# Patient Record
Sex: FEMALE | Race: WHITE | NOT HISPANIC OR LATINO | Employment: UNEMPLOYED
[De-identification: names, ages, dates, MRNs, and addresses within clinical notes are randomized per-mention and may not be internally consistent; named-entity substitution may affect disease eponyms.]

---

## 2017-06-17 ENCOUNTER — HEALTH MAINTENANCE LETTER (OUTPATIENT)
Age: 32
End: 2017-06-17

## 2018-06-05 ENCOUNTER — HOSPITAL ENCOUNTER (EMERGENCY)
Facility: CLINIC | Age: 33
Discharge: HOME | End: 2018-06-05
Attending: EMERGENCY MEDICINE | Admitting: EMERGENCY MEDICINE
Payer: COMMERCIAL

## 2018-06-05 ENCOUNTER — APPOINTMENT (OUTPATIENT)
Dept: GENERAL RADIOLOGY | Facility: CLINIC | Age: 33
End: 2018-06-05
Attending: EMERGENCY MEDICINE
Payer: COMMERCIAL

## 2018-06-05 VITALS
SYSTOLIC BLOOD PRESSURE: 152 MMHG | TEMPERATURE: 97 F | HEIGHT: 62 IN | DIASTOLIC BLOOD PRESSURE: 148 MMHG | WEIGHT: 255 LBS | BODY MASS INDEX: 46.93 KG/M2 | RESPIRATION RATE: 14 BRPM | OXYGEN SATURATION: 98 %

## 2018-06-05 DIAGNOSIS — R11.2 NON-INTRACTABLE VOMITING WITH NAUSEA, UNSPECIFIED VOMITING TYPE: ICD-10-CM

## 2018-06-05 DIAGNOSIS — R07.89 CHEST DISCOMFORT: ICD-10-CM

## 2018-06-05 LAB
ANION GAP SERPL CALCULATED.3IONS-SCNC: 13 MMOL/L (ref 3–14)
BASOPHILS # BLD AUTO: 0 10E9/L (ref 0–0.2)
BASOPHILS NFR BLD AUTO: 0.2 %
BUN SERPL-MCNC: 8 MG/DL (ref 7–30)
CALCIUM SERPL-MCNC: 9 MG/DL (ref 8.5–10.1)
CHLORIDE SERPL-SCNC: 106 MMOL/L (ref 94–109)
CO2 SERPL-SCNC: 21 MMOL/L (ref 20–32)
CREAT SERPL-MCNC: 0.64 MG/DL (ref 0.52–1.04)
D DIMER PPP FEU-MCNC: <0.3 UG/ML FEU (ref 0–0.5)
DIFFERENTIAL METHOD BLD: ABNORMAL
EOSINOPHIL # BLD AUTO: 0 10E9/L (ref 0–0.7)
EOSINOPHIL NFR BLD AUTO: 0 %
ERYTHROCYTE [DISTWIDTH] IN BLOOD BY AUTOMATED COUNT: 12.7 % (ref 10–15)
GFR SERPL CREATININE-BSD FRML MDRD: >90 ML/MIN/1.7M2
GLUCOSE SERPL-MCNC: 139 MG/DL (ref 70–99)
HCT VFR BLD AUTO: 44.6 % (ref 35–47)
HGB BLD-MCNC: 15.8 G/DL (ref 11.7–15.7)
IMM GRANULOCYTES # BLD: 0 10E9/L (ref 0–0.4)
IMM GRANULOCYTES NFR BLD: 0.4 %
INTERPRETATION ECG - MUSE: NORMAL
LYMPHOCYTES # BLD AUTO: 0.8 10E9/L (ref 0.8–5.3)
LYMPHOCYTES NFR BLD AUTO: 7.5 %
MCH RBC QN AUTO: 30.6 PG (ref 26.5–33)
MCHC RBC AUTO-ENTMCNC: 35.4 G/DL (ref 31.5–36.5)
MCV RBC AUTO: 86 FL (ref 78–100)
MONOCYTES # BLD AUTO: 0.4 10E9/L (ref 0–1.3)
MONOCYTES NFR BLD AUTO: 3.9 %
NEUTROPHILS # BLD AUTO: 9.8 10E9/L (ref 1.6–8.3)
NEUTROPHILS NFR BLD AUTO: 88 %
NRBC # BLD AUTO: 0 10*3/UL
NRBC BLD AUTO-RTO: 0 /100
PLATELET # BLD AUTO: 250 10E9/L (ref 150–450)
POTASSIUM SERPL-SCNC: 3.5 MMOL/L (ref 3.4–5.3)
RBC # BLD AUTO: 5.16 10E12/L (ref 3.8–5.2)
SODIUM SERPL-SCNC: 140 MMOL/L (ref 133–144)
TROPONIN I SERPL-MCNC: <0.015 UG/L (ref 0–0.04)
WBC # BLD AUTO: 11.1 10E9/L (ref 4–11)

## 2018-06-05 PROCEDURE — 25000125 ZZHC RX 250: Performed by: EMERGENCY MEDICINE

## 2018-06-05 PROCEDURE — 96361 HYDRATE IV INFUSION ADD-ON: CPT

## 2018-06-05 PROCEDURE — 85025 COMPLETE CBC W/AUTO DIFF WBC: CPT | Performed by: EMERGENCY MEDICINE

## 2018-06-05 PROCEDURE — 93005 ELECTROCARDIOGRAM TRACING: CPT

## 2018-06-05 PROCEDURE — 99285 EMERGENCY DEPT VISIT HI MDM: CPT | Mod: 25

## 2018-06-05 PROCEDURE — 80048 BASIC METABOLIC PNL TOTAL CA: CPT | Performed by: EMERGENCY MEDICINE

## 2018-06-05 PROCEDURE — 96374 THER/PROPH/DIAG INJ IV PUSH: CPT

## 2018-06-05 PROCEDURE — 71046 X-RAY EXAM CHEST 2 VIEWS: CPT

## 2018-06-05 PROCEDURE — 84484 ASSAY OF TROPONIN QUANT: CPT | Performed by: EMERGENCY MEDICINE

## 2018-06-05 PROCEDURE — 25000128 H RX IP 250 OP 636: Performed by: EMERGENCY MEDICINE

## 2018-06-05 PROCEDURE — 85379 FIBRIN DEGRADATION QUANT: CPT | Performed by: EMERGENCY MEDICINE

## 2018-06-05 PROCEDURE — 96375 TX/PRO/DX INJ NEW DRUG ADDON: CPT

## 2018-06-05 PROCEDURE — 25000132 ZZH RX MED GY IP 250 OP 250 PS 637: Performed by: EMERGENCY MEDICINE

## 2018-06-05 RX ORDER — METOCLOPRAMIDE 10 MG/1
10 TABLET ORAL 4 TIMES DAILY PRN
Qty: 10 TABLET | Refills: 0 | Status: SHIPPED | OUTPATIENT
Start: 2018-06-05 | End: 2021-04-16

## 2018-06-05 RX ORDER — METOCLOPRAMIDE HYDROCHLORIDE 5 MG/ML
10 INJECTION INTRAMUSCULAR; INTRAVENOUS ONCE
Status: COMPLETED | OUTPATIENT
Start: 2018-06-05 | End: 2018-06-05

## 2018-06-05 RX ORDER — ONDANSETRON 2 MG/ML
4 INJECTION INTRAMUSCULAR; INTRAVENOUS
Status: COMPLETED | OUTPATIENT
Start: 2018-06-05 | End: 2018-06-05

## 2018-06-05 RX ORDER — DIPHENHYDRAMINE HYDROCHLORIDE 50 MG/ML
25 INJECTION INTRAMUSCULAR; INTRAVENOUS ONCE
Status: COMPLETED | OUTPATIENT
Start: 2018-06-05 | End: 2018-06-05

## 2018-06-05 RX ORDER — SODIUM CHLORIDE, SODIUM LACTATE, POTASSIUM CHLORIDE, CALCIUM CHLORIDE 600; 310; 30; 20 MG/100ML; MG/100ML; MG/100ML; MG/100ML
1000 INJECTION, SOLUTION INTRAVENOUS CONTINUOUS
Status: DISCONTINUED | OUTPATIENT
Start: 2018-06-05 | End: 2018-06-05 | Stop reason: HOSPADM

## 2018-06-05 RX ORDER — PROMETHAZINE HYDROCHLORIDE 25 MG/ML
25 INJECTION, SOLUTION INTRAMUSCULAR; INTRAVENOUS ONCE
Status: DISCONTINUED | OUTPATIENT
Start: 2018-06-05 | End: 2018-06-05 | Stop reason: HOSPADM

## 2018-06-05 RX ADMIN — DIPHENHYDRAMINE HYDROCHLORIDE 25 MG: 50 INJECTION, SOLUTION INTRAMUSCULAR; INTRAVENOUS at 12:55

## 2018-06-05 RX ADMIN — SODIUM CHLORIDE, POTASSIUM CHLORIDE, SODIUM LACTATE AND CALCIUM CHLORIDE 1000 ML: 600; 310; 30; 20 INJECTION, SOLUTION INTRAVENOUS at 11:28

## 2018-06-05 RX ADMIN — ONDANSETRON 4 MG: 2 INJECTION INTRAMUSCULAR; INTRAVENOUS at 11:28

## 2018-06-05 RX ADMIN — LIDOCAINE HYDROCHLORIDE 30 ML: 20 SOLUTION ORAL; TOPICAL at 11:57

## 2018-06-05 RX ADMIN — METOCLOPRAMIDE 10 MG: 5 INJECTION, SOLUTION INTRAMUSCULAR; INTRAVENOUS at 12:55

## 2018-06-05 ASSESSMENT — ENCOUNTER SYMPTOMS
VOMITING: 1
HEMATURIA: 0
DYSURIA: 0
FEVER: 0
FREQUENCY: 0
DIARRHEA: 0
ABDOMINAL PAIN: 0
NAUSEA: 1
SHORTNESS OF BREATH: 1

## 2018-06-05 NOTE — ED AVS SNAPSHOT
Emergency Department    [address] [facility] [address]    Phone:  [phone]    Fax:  [phone]                                       Layne Blas   MRN: [id number]    Department:   Emergency Department   Date of Visit:  6/5/2018           After Visit Summary Signature Page     I have received my discharge instructions, and my questions have been answered. I have discussed any challenges I see with this plan with the nurse or doctor.    ..........................................................................................................................................  Patient/Patient Representative Signature      ..........................................................................................................................................  Patient Representative Print Name and Relationship to Patient    ..................................................               ................................................  Date                                            Time    ..........................................................................................................................................  Reviewed by Signature/Title    ...................................................              ..............................................  Date                                                            Time

## 2018-06-05 NOTE — ED AVS SNAPSHOT
Emergency Department    [address] [facility] [address]    Phone:  [phone]    Fax:  [phone]                                       Layne Blas   MRN: [id number]    Department:   Emergency Department   Date of Visit:  6/5/2018           Patient Information     Date Of Birth          1985        Your diagnoses for this visit were:     Non-intractable vomiting with nausea, unspecified vomiting type     Chest discomfort        You were seen by Austyn Oh DO.      Follow-up Information     Follow up with Clinic, [facility] In 2 days.    Why:  As needed    Contact information:    [address] [facility] [address] [phone]          Follow up with  Emergency Department.    Specialty:  EMERGENCY MEDICINE    Why:  If symptoms worsen    Contact information:    [address] [facility] [id number] [phone]        Discharge Instructions       Return to the emergency department or seek medical care as instructed if your symptoms fail to improve or significantly worsen.    Take Acetaminophen (aka Tylenol) and/or Pepcid as needed for symptom/pain relief; use as directed.    Take reglan as need for nausea; use as directed.    Follow-up as indicated on page 1.  Maintain adequate hydration and get plenty of rest.    Discharge References/Attachments     VOMITING (6Y-ADULT) (ENGLISH)      24 Hour Appointment Hotline       To make an appointment at any [facility], call [phone] ([phone]). If you don't have a family doctor or clinic, we will help you find one. [facility] clinics are conveniently located to serve the needs of you and your family.             Review of your medicines      START taking        Dose / Directions Last dose taken    metoclopramide 10 MG tablet   Commonly known as:  REGLAN   Dose:  10 mg   Quantity:  10 tablet        Take 1 tablet (10 mg) by mouth 4 times daily as needed For  nausea   Refills:  0          Our records show that you are taking the medicines listed below. If these are incorrect, please call your family doctor or clinic.        Dose / Directions Last dose taken    IMITREX PO   Indication:  [facility] [phone]        Refills:  0        QUEtiapine 25 MG tablet   Commonly known as:  SEROquel   Dose:  25 mg   Quantity:  30 tablet        Take 1 tablet (25 mg) by mouth At Bedtime   Refills:  0        UNKNOWN TO PATIENT   Dose:  1 tablet        Take 1 tablet by mouth daily Birth Control Pill   Refills:  0        vilazodone 20 MG Tabs tablet   Commonly known as:  VIIBRYD   Dose:  20 mg   Quantity:  1 tablet        Take 1 tablet (20 mg) by mouth daily   Refills:  0        VITAMIN D (CHOLECALCIFEROL) PO        Take by mouth daily   Refills:  0                Prescriptions were sent or printed at these locations (1 Prescription)                   Other Prescriptions                Printed at Department/Unit printer (1 of 1)         metoclopramide (REGLAN) 10 MG tablet                Procedures and tests performed during your visit     Basic metabolic panel    CBC with platelets differential    D dimer quantitative    EKG 12 lead    Troponin I    XR Chest 2 Views      Orders Needing Specimen Collection     None      Pending Results     No orders found from 6/3/2018 to 6/6/2018.            Pending Culture Results     No orders found from 6/3/2018 to 6/6/2018.            Pending Results Instructions     If you had any lab results that were not finalized at the time of your Discharge, you can call the ED Lab Result RN at [phone]. You will be contacted by this team for any positive Lab results or changes in treatment. The nurses are available 7 days a week from 10A to 6:30P.  You can leave a message 24 hours per day and they will return your call.        Test Results From Your Hospital Stay        6/5/2018 11:36 AM      Component Results     Component Value Ref Range & Units Status     WBC 11.1 (H) 4.0 - 11.0 10e9/L Final    RBC Count 5.16 3.8 - 5.2 10e12/L Final    Hemoglobin 15.8 (H) 11.7 - 15.7 g/dL Final    Hematocrit 44.6 35.0 - 47.0 % Final    MCV 86 78 - 100 fl Final    MCH 30.6 26.5 - 33.0 pg Final    MCHC 35.4 31.5 - 36.5 g/dL Final    RDW 12.7 10.0 - 15.0 % Final    Platelet Count 250 150 - 450 10e9/L Final    Diff Method Automated Method  Final    % Neutrophils 88.0 % Final    % Lymphocytes 7.5 % Final    % Monocytes 3.9 % Final    % Eosinophils 0.0 % Final    % Basophils 0.2 % Final    % Immature Granulocytes 0.4 % Final    Nucleated RBCs 0 0 /100 Final    Absolute Neutrophil 9.8 (H) 1.6 - 8.3 10e9/L Final    Absolute Lymphocytes 0.8 0.8 - 5.3 10e9/L Final    Absolute Monocytes 0.4 0.0 - 1.3 10e9/L Final    Absolute Eosinophils 0.0 0.0 - 0.7 10e9/L Final    Absolute Basophils 0.0 0.0 - 0.2 10e9/L Final    Abs Immature Granulocytes 0.0 0 - 0.4 10e9/L Final    Absolute Nucleated RBC 0.0  Final         6/5/2018 11:57 AM      Component Results     Component Value Ref Range & Units Status    D Dimer <0.3 0.0 - 0.50 ug/ml FEU Final    This D-dimer assay is intended for use in conjunction with a clinical pretest   probability assessment model to exclude pulmonary embolism (PE) and deep   venous thrombosis (DVT) in outpatients suspected of PE or DVT. The cut-off   value is 0.5 ug/mL FEU.           6/5/2018 11:56 AM      Component Results     Component Value Ref Range & Units Status    Troponin I ES <0.015 0.000 - 0.045 ug/L Final    The 99th percentile for upper reference range is 0.045 ug/L.  Troponin values   in the range of 0.045 - 0.120 ug/L may be associated with risks of adverse   clinical events.           6/5/2018 11:52 AM      Component Results     Component Value Ref Range & Units Status    Sodium 140 133 - 144 mmol/L Final    Potassium 3.5 3.4 - 5.3 mmol/L Final    Chloride 106 94 - 109 mmol/L Final    Carbon Dioxide 21 20 - 32 mmol/L Final    Anion Gap 13 3 - 14 mmol/L Final     Glucose 139 (H) 70 - 99 mg/dL Final    Urea Nitrogen 8 7 - 30 mg/dL Final    Creatinine 0.64 0.52 - 1.04 mg/dL Final    GFR Estimate >90 >60 mL/min/1.7m2 Final    Non  GFR Calc    GFR Estimate If Black >90 >60 mL/min/1.7m2 Final    African American GFR Calc    Calcium 9.0 8.5 - 10.1 mg/dL Final         6/5/2018 12:33 PM      Narrative     XR CHEST 2 VW 6/5/2018 11:50 AM    COMPARISON: None.    HISTORY: Chest pain and vomiting.        Impression     IMPRESSION: Cardiac silhouette and pulmonary vasculature are within  normal limits. No focal airspace disease, pleural effusion or  pneumothorax.    JEY LAL MD                Clinical Quality Measure: Blood Pressure Screening     Your blood pressure was checked while you were in the emergency department today. The last reading we obtained was  BP: (!) 141/95 . Please read the guidelines below about what these numbers mean and what you should do about them.  If your systolic blood pressure (the top number) is less than 120 and your diastolic blood pressure (the bottom number) is less than 80, then your blood pressure is normal. There is nothing more that you need to do about it.  If your systolic blood pressure (the top number) is 120-139 or your diastolic blood pressure (the bottom number) is 80-89, your blood pressure may be higher than it should be. You should have your blood pressure rechecked within a year by a primary care provider.  If your systolic blood pressure (the top number) is 140 or greater or your diastolic blood pressure (the bottom number) is 90 or greater, you may have high blood pressure. High blood pressure is treatable, but if left untreated over time it can put you at risk for heart attack, stroke, or kidney failure. You should have your blood pressure rechecked by a primary care provider within the next 4 weeks.  If your provider in the emergency department today gave you specific instructions to follow-up with your doctor or  provider even sooner than that, you should follow that instruction and not wait for up to 4 weeks for your follow-up visit.        Thank you for choosing [facility]       Thank you for choosing [facility] for your care. Our goal is always to provide you with excellent care. Hearing back from our patients is one way we can continue to improve our services. Please take a few minutes to complete the written survey that you may receive in the mail after you visit with us. Thank you!        [employer] Information     [employer] gives you secure access to your electronic health record. If you see a primary care provider, you can also send messages to your care team and make appointments. If you have questions, please call your primary care clinic.  If you do not have a primary care provider, please call [phone] and they will assist you.        Care EveryWhere ID     This is your Care EveryWhere ID. This could be used by other organizations to access your [facility] medical records  [id number]        Equal Access to Services     DANNA PARRISH : Britany Leahy, stalin jain, stewart brown, kwabena lr . So [facility] [phone].    ATENCIÓN: Si habla español, tiene a garcia disposición servicios gratuitos de asistencia lingüística. Llame al [phone].    We comply with applicable federal civil rights laws and [place] laws. We do not discriminate on the basis of race, color, national origin, age, disability, sex, sexual orientation, or gender identity.            After Visit Summary       This is your record. Keep this with you and show to your community pharmacist(s) and doctor(s) at your next visit.

## 2018-06-05 NOTE — ED AVS SNAPSHOT
Emergency Department    [address] [facility] [address]    Phone:  [phone]    Fax:  [phone]                                       Layne Blas   MRN: [id number]    Department:   Emergency Department   Date of Visit:  6/5/2018           Patient Information     Date Of Birth          1985        Your diagnoses for this visit were:     Non-intractable vomiting with nausea, unspecified vomiting type     Chest discomfort        You were seen by Austyn Oh DO.      Follow-up Information     Follow up with Clinic, [facility] In 2 days.    Why:  As needed    Contact information:    [address] [facility] [address] [phone]          Follow up with  Emergency Department.    Specialty:  EMERGENCY MEDICINE    Why:  If symptoms worsen    Contact information:    [address] [facility] [id number] [phone]        Discharge Instructions       Return to the emergency department or seek medical care as instructed if your symptoms fail to improve or significantly worsen.    Take Acetaminophen (aka Tylenol) and/or Pepcid as needed for symptom/pain relief; use as directed.    Take reglan as need for nausea; use as directed.    Follow-up as indicated on page 1.  Maintain adequate hydration and get plenty of rest.    Discharge References/Attachments     VOMITING (6Y-ADULT) (ENGLISH)      [facility] Scheduling Hotline     To schedule an appointment at [facility], please call [phone]. If you don't have a family doctor or clinic, we will help you find one. [facility] clinics are conveniently located to serve the needs of you and your family.           Review of your medicines      START taking        Dose / Directions Last dose taken    metoclopramide 10 MG tablet   Commonly known as:  REGLAN   Dose:  10 mg   Quantity:  10 tablet        Take 1 tablet (10 mg) by mouth 4 times daily as needed For nausea   Refills:   0          Our records show that you are taking the medicines listed below. If these are incorrect, please call your family doctor or clinic.        Dose / Directions Last dose taken    IMITREX PO   Indication:  [facility] [phone]        Refills:  0        QUEtiapine 25 MG tablet   Commonly known as:  SEROquel   Dose:  25 mg   Quantity:  30 tablet        Take 1 tablet (25 mg) by mouth At Bedtime   Refills:  0        UNKNOWN TO PATIENT   Dose:  1 tablet        Take 1 tablet by mouth daily Birth Control Pill   Refills:  0        vilazodone 20 MG Tabs tablet   Commonly known as:  VIIBRYD   Dose:  20 mg   Quantity:  1 tablet        Take 1 tablet (20 mg) by mouth daily   Refills:  0        VITAMIN D (CHOLECALCIFEROL) PO        Take by mouth daily   Refills:  0                Prescriptions were sent or printed at these locations (1 Prescription)                   Other Prescriptions                Printed at Department/Unit printer (1 of 1)         metoclopramide (REGLAN) 10 MG tablet                Procedures and tests performed during your visit     Basic metabolic panel    CBC with platelets differential    D dimer quantitative    EKG 12 lead    Troponin I    XR Chest 2 Views      Orders Needing Specimen Collection     None      Pending Results     No orders found from 6/3/2018 to 6/6/2018.            Pending Culture Results     No orders found from 6/3/2018 to 6/6/2018.            Pending Results Instructions     If you had any lab results that were not finalized at the time of your Discharge, you can call the ED Lab Result RN at [phone]. You will be contacted by this team for any positive Lab results or changes in treatment. The nurses are available 7 days a week from 10A to 6:30P.  You can leave a message 24 hours per day and they will return your call.        Test Results From Your Hospital Stay        6/5/2018 11:36 AM      Component Results     Component Value Ref Range & Units Status    WBC 11.1 (H)  4.0 - 11.0 10e9/L Final    RBC Count 5.16 3.8 - 5.2 10e12/L Final    Hemoglobin 15.8 (H) 11.7 - 15.7 g/dL Final    Hematocrit 44.6 35.0 - 47.0 % Final    MCV 86 78 - 100 fl Final    MCH 30.6 26.5 - 33.0 pg Final    MCHC 35.4 31.5 - 36.5 g/dL Final    RDW 12.7 10.0 - 15.0 % Final    Platelet Count 250 150 - 450 10e9/L Final    Diff Method Automated Method  Final    % Neutrophils 88.0 % Final    % Lymphocytes 7.5 % Final    % Monocytes 3.9 % Final    % Eosinophils 0.0 % Final    % Basophils 0.2 % Final    % Immature Granulocytes 0.4 % Final    Nucleated RBCs 0 0 /100 Final    Absolute Neutrophil 9.8 (H) 1.6 - 8.3 10e9/L Final    Absolute Lymphocytes 0.8 0.8 - 5.3 10e9/L Final    Absolute Monocytes 0.4 0.0 - 1.3 10e9/L Final    Absolute Eosinophils 0.0 0.0 - 0.7 10e9/L Final    Absolute Basophils 0.0 0.0 - 0.2 10e9/L Final    Abs Immature Granulocytes 0.0 0 - 0.4 10e9/L Final    Absolute Nucleated RBC 0.0  Final         6/5/2018 11:57 AM      Component Results     Component Value Ref Range & Units Status    D Dimer <0.3 0.0 - 0.50 ug/ml FEU Final    This D-dimer assay is intended for use in conjunction with a clinical pretest   probability assessment model to exclude pulmonary embolism (PE) and deep   venous thrombosis (DVT) in outpatients suspected of PE or DVT. The cut-off   value is 0.5 ug/mL FEU.           6/5/2018 11:56 AM      Component Results     Component Value Ref Range & Units Status    Troponin I ES <0.015 0.000 - 0.045 ug/L Final    The 99th percentile for upper reference range is 0.045 ug/L.  Troponin values   in the range of 0.045 - 0.120 ug/L may be associated with risks of adverse   clinical events.           6/5/2018 11:52 AM      Component Results     Component Value Ref Range & Units Status    Sodium 140 133 - 144 mmol/L Final    Potassium 3.5 3.4 - 5.3 mmol/L Final    Chloride 106 94 - 109 mmol/L Final    Carbon Dioxide 21 20 - 32 mmol/L Final    Anion Gap 13 3 - 14 mmol/L Final    Glucose 139 (H)  70 - 99 mg/dL Final    Urea Nitrogen 8 7 - 30 mg/dL Final    Creatinine 0.64 0.52 - 1.04 mg/dL Final    GFR Estimate >90 >60 mL/min/1.7m2 Final    Non  GFR Calc    GFR Estimate If Black >90 >60 mL/min/1.7m2 Final    African American GFR Calc    Calcium 9.0 8.5 - 10.1 mg/dL Final         6/5/2018 12:33 PM      Narrative     XR CHEST 2 VW 6/5/2018 11:50 AM    COMPARISON: None.    HISTORY: Chest pain and vomiting.        Impression     IMPRESSION: Cardiac silhouette and pulmonary vasculature are within  normal limits. No focal airspace disease, pleural effusion or  pneumothorax.    JEY LAL MD                Clinical Quality Measure: Blood Pressure Screening     Your blood pressure was checked while you were in the emergency department today. The last reading we obtained was  BP: (!) 141/95 . Please read the guidelines below about what these numbers mean and what you should do about them.  If your systolic blood pressure (the top number) is less than 120 and your diastolic blood pressure (the bottom number) is less than 80, then your blood pressure is normal. There is nothing more that you need to do about it.  If your systolic blood pressure (the top number) is 120-139 or your diastolic blood pressure (the bottom number) is 80-89, your blood pressure may be higher than it should be. You should have your blood pressure rechecked within a year by a primary care provider.  If your systolic blood pressure (the top number) is 140 or greater or your diastolic blood pressure (the bottom number) is 90 or greater, you may have high blood pressure. High blood pressure is treatable, but if left untreated over time it can put you at risk for heart attack, stroke, or kidney failure. You should have your blood pressure rechecked by a primary care provider within the next 4 weeks.  If your provider in the emergency department today gave you specific instructions to follow-up with your doctor or provider even  sooner than that, you should follow that instruction and not wait for up to 4 weeks for your follow-up visit.        Thank you for choosing [facility]       Thank you for choosing [facility] for your care. Our goal is always to provide you with excellent care. Hearing back from our patients is one way we can continue to improve our services. Please take a few minutes to complete the written survey that you may receive in the mail after you visit with us. Thank you!        [employer] Information     [employer] gives you secure access to your electronic health record. If you see a primary care provider, you can also send messages to your care team and make appointments. If you have questions, please call your primary care clinic.  If you do not have a primary care provider, please call [phone] and they will assist you.        Care EveryWhere ID     This is your Care EveryWhere ID. This could be used by other organizations to access your [facility] medical records  [id number]        Equal Access to Services     DANNA PARRISH : Britany Leahy, stalin jain, stewart brown, kwabena lr . So [facility] [phone].    ATENCIÓN: Si habla español, tiene a garcia disposición servicios gratuitos de asistencia lingüística. Llame al [phone].    We comply with applicable federal civil rights laws and [place] laws. We do not discriminate on the basis of race, color, national origin, age, disability, sex, sexual orientation, or gender identity.            After Visit Summary       This is your record. Keep this with you and show to your community pharmacist(s) and doctor(s) at your next visit.

## 2018-06-05 NOTE — ED PROVIDER NOTES
History     Chief Complaint:  Chest pain    HPI   Layne Blas is a 33 year old female who presents with chest pain. The patient reports that she went to bed feeling well last evening and woke up this morning approximately 6 hours ago at 0530 with vomiting. She has also experienced some chest discomfort following this vomiting that she describes as a 3 or 4 out of 10. This is a central chest pain with some associated shortness of breath. The patient denies having had chest pain like this in the past. She has felt nauseous during this time but denies any abdominal pain or diarrhea. She did have a normal bowel movement during this time. She does endorse having had some tingling in her fingers and tachycardia. She does not believe that she feels overly anxious at the moment. She denies having experienced any urinary symptoms, fevers, leg swelling, or other symptoms. The patient notes that she does not have regular periods due to her birth control. Of note the patient does endorse having had two alcoholic beverages last evening    Cardiac/PE/DVT Risk Factors:  History of hypertension - No  History of hyperlipidemia - No  History of diabetes - No  History of smoking - No  Personal history of PE/DVT - No  Family history of PE/DVT - No  Family history of heart complications - No  Recent travel - No  Recent surgery - No  Other immobilizations - No  Cancer - No  Hormone use- Yes     Allergies:  No Known Drug Allergies      Medications:    Seroquel  Imitrex  Viibryd  Klonopin    Past Medical History:    Anxiety  Depression  Migraines  Previous reproductive problems  Obesity  PCOS  Rh incompatibility  Thyroid disease    Past Surgical History:    Removal of kidney stone  Parathyroidectomy     Family History:    The patient denies any relevant family medical history.     Social History:  The patient was accompanied to the ED by her .  Smoking Status: No  Smokeless Tobacco: No  Alcohol Use: Yes   Marital Status:   " [2]    Review of Systems   Constitutional: Negative for fever.   Respiratory: Positive for shortness of breath.    Cardiovascular: Positive for chest pain. Negative for leg swelling.   Gastrointestinal: Positive for nausea and vomiting. Negative for abdominal pain and diarrhea.   Genitourinary: Negative for dysuria, frequency, hematuria and urgency.   All other systems reviewed and are negative.    Physical Exam   Vitals:  Patient Vitals for the past 24 hrs:   BP Temp Temp src Heart Rate Resp SpO2 Height Weight   06/05/18 1315 (!) 152/148 - - 47 14 98 % - -   06/05/18 1300 - - - 58 14 100 % - -   06/05/18 1245 (!) 141/95 - - 55 24 100 % - -   06/05/18 1230 (!) 141/92 - - - 22 100 % - -   06/05/18 1215 133/77 - - 51 13 100 % - -   06/05/18 1200 (!) 137/37 - - 57 (!) 50 100 % - -   06/05/18 1130 (!) 156/140 - - 56 (!) 32 98 % - -   06/05/18 1116 (!) 147/126 - - - - - - -   06/05/18 1108 168/83 97  F (36.1  C) Temporal 69 24 100 % 1.575 m (5' 2\") 115.7 kg (255 lb)        Physical Exam  General: Alert and cooperative with exam. Patient in mild to moderate distress. Normal mentation.  Anxious in appearance, hyperventilating  Head:  Scalp is NC/AT  Eyes:  No scleral icterus, PERRL  ENT:  The external nose and ears are normal. The oropharynx is normal and without erythema; mucus membranes are moist. Uvula midline, no evidence of deep space infection.  Neck:  Normal range of motion without rigidity.  CV:  Regular rate and rhythm    No pathologic murmur   Resp:  Breath sounds are clear bilaterally    Non-labored, no retractions or accessory muscle use  GI:  Abdomen is soft, no distension, no tenderness. No peritoneal signs.  Obese  MS:  No lower extremity edema.  Chest pain not reproducible to palpation  Skin:  Warm and dry, No rash or lesions noted.  Neuro: Oriented x 3. No gross motor deficits.        Emergency Department Course     ECG:  ECG taken at 1107, ECG read at 1116  Normal sinus rhythm with sinus " arrhythmia. Nonspecific T wave abnormality. Prolonged QT. Abnormal ECG.  Rate 60 bpm. IN interval 150. QRS duration 90. QT/QTc 474/474. P-R-T axes 74, 47, 23.     Imaging:  Radiology findings were communicated with the patient who voiced understanding of the findings.  XR chest 2 views:  IMPRESSION: Cardiac silhouette and pulmonary vasculature are within  normal limits. No focal airspace disease, pleural effusion or  Pneumothorax.  Reading per radiology.     Laboratory:  Laboratory findings were communicated with the patient who voiced understanding of the findings.  CBC: WBC: 11.1(H), HGB: 15.8(H), Neutrophil: 9.8(H) WNL ()  D Dimer (Collected 1124): <0.3   Troponin (Collected 1124): <0.015   BMP: Glucose: 139(H)WNL (Creatinine 0.64)     Interventions:  1128 Normal Saline 1000 mL IV   1128 Zofran 4 mg IV   1157 GI Cocktail (Maalox/Mylanta and viscous Lidocaine), 30 mL suspension, PO    1251 Normal Saline 1000 mL IV   1255 Reglan 10 mg IV  1255 Benadryl 25 mg IV    Emergency Department Course:  Nursing notes and vitals reviewed.  I performed an exam of the patient as documented above.   IV was inserted and blood was drawn for laboratory testing, results above.     Findings and plan explained to the Patient. Patient discharged home with instructions regarding supportive care, medications, and reasons to return. The importance of close follow-up was reviewed.      I personally reviewed the laboratory results with the patient and answered all related questions prior to discharge.    Impression & Plan      Medical Decision Making:  Patient is a 33-year-old female who presents with multiple complaints including chest pain, mild shortness of breath, vomiting, nausea, and tingling in fingertips bilaterally.  Patient's medical history and records were reviewed.  Initial consideration for, not limited to, atypical ACS/MI, arrhythmia, PE, infectious process, hyperventilation, gastritis, GERD/esophageal spasm, among  others.  Labs, EKG, and imaging were obtained.  EKG demonstrated nonspecific T-wave inversion and mildly prolonged QT; no previous for comparison.  Labs including troponin and d-dimer were unremarkable; PE and ACS unlikely.  Chest x-ray without significant findings.  Patient was provided IV fluids, Zofran, GI cocktail, Reglan, and Benadryl with noted improvement in nausea.  On reevaluation patient reports symptoms have resolved.  She was noted to be tachypneic and anxious on initial arrival which is likely cause for tingling in fingertips.  No pain on abdominal exam.  She did report having several alcoholic beverages the night before and  presentation may be secondary to mild gastritis/GERD.  Recommended continued supportive care and close follow-up with PCP as needed; provided prescription for Reglan for breakthrough nausea.  Return precautions discussed.  Patient discharged home.  At the time of discharge patient was hemodynamic with stable, neurologically intact, and afebrile.     Impression:     ICD-10-CM    1. Non-intractable vomiting with nausea, unspecified vomiting type R11.2    2. Chest discomfort R07.89         Disposition:  Discharged home        Discharge Medications:  Discharge Medication List as of 6/5/2018  1:29 PM      START taking these medications    Details   metoclopramide (REGLAN) 10 MG tablet Take 1 tablet (10 mg) by mouth 4 times daily as needed For nausea, Disp-10 tablet, R-0, Local Print             Scribe Disclosure:  Justin BELTRAN, am serving as a scribe at 11:14 AM on 6/5/2018 to document services personally performed by Austyn Oh DO, based on my observations and the provider's statements to me.    EMERGENCY DEPARTMENT       Austyn Oh DO  06/06/18 1004       Austyn Oh DO  06/06/18 1004

## 2019-10-01 ENCOUNTER — HEALTH MAINTENANCE LETTER (OUTPATIENT)
Age: 34
End: 2019-10-01

## 2019-12-17 NOTE — DISCHARGE INSTRUCTIONS
Return to the emergency department or seek medical care as instructed if your symptoms fail to improve or significantly worsen.    Take Acetaminophen (aka Tylenol) and/or Pepcid as needed for symptom/pain relief; use as directed.    Take reglan as need for nausea; use as directed.    Follow-up as indicated on page 1.  Maintain adequate hydration and get plenty of rest.   [email or web address] with patient/given to Security

## 2021-01-15 ENCOUNTER — HEALTH MAINTENANCE LETTER (OUTPATIENT)
Age: 36
End: 2021-01-15

## 2021-04-13 ENCOUNTER — APPOINTMENT (OUTPATIENT)
Dept: ULTRASOUND IMAGING | Facility: CLINIC | Age: 36
End: 2021-04-13
Attending: PHYSICIAN ASSISTANT
Payer: COMMERCIAL

## 2021-04-13 ENCOUNTER — HOSPITAL ENCOUNTER (EMERGENCY)
Facility: CLINIC | Age: 36
Discharge: HOME | End: 2021-04-13
Attending: PHYSICIAN ASSISTANT | Admitting: PHYSICIAN ASSISTANT
Payer: COMMERCIAL

## 2021-04-13 VITALS
BODY MASS INDEX: 47.84 KG/M2 | RESPIRATION RATE: 14 BRPM | WEIGHT: 260 LBS | OXYGEN SATURATION: 98 % | HEIGHT: 62 IN | DIASTOLIC BLOOD PRESSURE: 98 MMHG | SYSTOLIC BLOOD PRESSURE: 159 MMHG | TEMPERATURE: 97.8 F | HEART RATE: 97 BPM

## 2021-04-13 DIAGNOSIS — R10.31 RLQ ABDOMINAL PAIN: ICD-10-CM

## 2021-04-13 DIAGNOSIS — N30.00 ACUTE CYSTITIS WITHOUT HEMATURIA: ICD-10-CM

## 2021-04-13 DIAGNOSIS — O26.891 ABDOMINAL PAIN IN PREGNANCY, FIRST TRIMESTER: ICD-10-CM

## 2021-04-13 DIAGNOSIS — R10.9 ABDOMINAL PAIN IN PREGNANCY, FIRST TRIMESTER: ICD-10-CM

## 2021-04-13 DIAGNOSIS — O21.9 NAUSEA AND VOMITING IN PREGNANCY: ICD-10-CM

## 2021-04-13 LAB
ALBUMIN UR-MCNC: 20 MG/DL
ALBUMIN UR-MCNC: 20 MG/DL
ANION GAP SERPL CALCULATED.3IONS-SCNC: 6 MMOL/L (ref 3–14)
APPEARANCE UR: ABNORMAL
APPEARANCE UR: ABNORMAL
B-HCG SERPL-ACNC: 108 IU/L (ref 0–5)
BACTERIA #/AREA URNS HPF: ABNORMAL /HPF
BASOPHILS # BLD AUTO: 0 10E9/L (ref 0–0.2)
BASOPHILS NFR BLD AUTO: 0.3 %
BILIRUB UR QL STRIP: NEGATIVE
BILIRUB UR QL STRIP: NEGATIVE
BUN SERPL-MCNC: 8 MG/DL (ref 7–30)
CALCIUM SERPL-MCNC: 8.6 MG/DL (ref 8.5–10.1)
CHLORIDE SERPL-SCNC: 106 MMOL/L (ref 94–109)
CO2 SERPL-SCNC: 25 MMOL/L (ref 20–32)
COLOR UR AUTO: YELLOW
COLOR UR AUTO: YELLOW
CREAT SERPL-MCNC: 0.69 MG/DL (ref 0.52–1.04)
DIFFERENTIAL METHOD BLD: NORMAL
EOSINOPHIL # BLD AUTO: 0 10E9/L (ref 0–0.7)
EOSINOPHIL NFR BLD AUTO: 0.2 %
ERYTHROCYTE [DISTWIDTH] IN BLOOD BY AUTOMATED COUNT: 12.7 % (ref 10–15)
GFR SERPL CREATININE-BSD FRML MDRD: >90 ML/MIN/{1.73_M2}
GLUCOSE SERPL-MCNC: 89 MG/DL (ref 70–99)
GLUCOSE UR STRIP-MCNC: NEGATIVE MG/DL
GLUCOSE UR STRIP-MCNC: NEGATIVE MG/DL
HCG UR QL: POSITIVE
HCT VFR BLD AUTO: 42.4 % (ref 35–47)
HGB BLD-MCNC: 14.6 G/DL (ref 11.7–15.7)
HGB UR QL STRIP: NEGATIVE
HGB UR QL STRIP: NEGATIVE
IMM GRANULOCYTES # BLD: 0 10E9/L (ref 0–0.4)
IMM GRANULOCYTES NFR BLD: 0.4 %
KETONES UR STRIP-MCNC: 100 MG/DL
KETONES UR STRIP-MCNC: 40 MG/DL
LEUKOCYTE ESTERASE UR QL STRIP: ABNORMAL
LEUKOCYTE ESTERASE UR QL STRIP: ABNORMAL
LYMPHOCYTES # BLD AUTO: 1.1 10E9/L (ref 0.8–5.3)
LYMPHOCYTES NFR BLD AUTO: 12.2 %
MCH RBC QN AUTO: 30.9 PG (ref 26.5–33)
MCHC RBC AUTO-ENTMCNC: 34.4 G/DL (ref 31.5–36.5)
MCV RBC AUTO: 90 FL (ref 78–100)
MONOCYTES # BLD AUTO: 0.5 10E9/L (ref 0–1.3)
MONOCYTES NFR BLD AUTO: 5 %
MUCOUS THREADS #/AREA URNS LPF: PRESENT /LPF
MUCOUS THREADS #/AREA URNS LPF: PRESENT /LPF
NEUTROPHILS # BLD AUTO: 7.5 10E9/L (ref 1.6–8.3)
NEUTROPHILS NFR BLD AUTO: 81.9 %
NITRATE UR QL: NEGATIVE
NITRATE UR QL: NEGATIVE
NRBC # BLD AUTO: 0 10*3/UL
NRBC BLD AUTO-RTO: 0 /100
PH UR STRIP: 6 PH (ref 5–7)
PH UR STRIP: 6 PH (ref 5–7)
PLATELET # BLD AUTO: 277 10E9/L (ref 150–450)
POTASSIUM SERPL-SCNC: 3.6 MMOL/L (ref 3.4–5.3)
RBC # BLD AUTO: 4.72 10E12/L (ref 3.8–5.2)
RBC #/AREA URNS AUTO: 3 /HPF (ref 0–2)
RBC #/AREA URNS AUTO: 4 /HPF (ref 0–2)
SODIUM SERPL-SCNC: 137 MMOL/L (ref 133–144)
SOURCE: ABNORMAL
SOURCE: ABNORMAL
SP GR UR STRIP: 1.03 (ref 1–1.03)
SP GR UR STRIP: 1.03 (ref 1–1.03)
SQUAMOUS #/AREA URNS AUTO: 15 /HPF (ref 0–1)
SQUAMOUS #/AREA URNS AUTO: 7 /HPF (ref 0–1)
UROBILINOGEN UR STRIP-MCNC: 2 MG/DL (ref 0–2)
UROBILINOGEN UR STRIP-MCNC: 2 MG/DL (ref 0–2)
WBC # BLD AUTO: 9.2 10E9/L (ref 4–11)
WBC #/AREA URNS AUTO: 17 /HPF (ref 0–5)
WBC #/AREA URNS AUTO: 23 /HPF (ref 0–5)

## 2021-04-13 PROCEDURE — 84702 CHORIONIC GONADOTROPIN TEST: CPT | Performed by: EMERGENCY MEDICINE

## 2021-04-13 PROCEDURE — 96375 TX/PRO/DX INJ NEW DRUG ADDON: CPT

## 2021-04-13 PROCEDURE — 81001 URINALYSIS AUTO W/SCOPE: CPT | Performed by: EMERGENCY MEDICINE

## 2021-04-13 PROCEDURE — 80048 BASIC METABOLIC PNL TOTAL CA: CPT | Performed by: EMERGENCY MEDICINE

## 2021-04-13 PROCEDURE — 96374 THER/PROPH/DIAG INJ IV PUSH: CPT

## 2021-04-13 PROCEDURE — 99285 EMERGENCY DEPT VISIT HI MDM: CPT | Mod: 25

## 2021-04-13 PROCEDURE — 85025 COMPLETE CBC W/AUTO DIFF WBC: CPT | Performed by: EMERGENCY MEDICINE

## 2021-04-13 PROCEDURE — 87086 URINE CULTURE/COLONY COUNT: CPT | Performed by: PHYSICIAN ASSISTANT

## 2021-04-13 PROCEDURE — 81025 URINE PREGNANCY TEST: CPT | Performed by: EMERGENCY MEDICINE

## 2021-04-13 PROCEDURE — 76817 TRANSVAGINAL US OBSTETRIC: CPT

## 2021-04-13 PROCEDURE — 250N000011 HC RX IP 250 OP 636: Performed by: PHYSICIAN ASSISTANT

## 2021-04-13 PROCEDURE — 81001 URINALYSIS AUTO W/SCOPE: CPT | Mod: 91 | Performed by: PHYSICIAN ASSISTANT

## 2021-04-13 RX ORDER — METOCLOPRAMIDE 5 MG/1
5 TABLET ORAL 3 TIMES DAILY PRN
Qty: 20 TABLET | Refills: 0 | Status: SHIPPED | OUTPATIENT
Start: 2021-04-13 | End: 2021-05-05

## 2021-04-13 RX ORDER — CEPHALEXIN 500 MG/1
500 CAPSULE ORAL 2 TIMES DAILY
Qty: 14 CAPSULE | Refills: 0 | Status: SHIPPED | OUTPATIENT
Start: 2021-04-13 | End: 2021-04-20

## 2021-04-13 RX ORDER — DIPHENHYDRAMINE HYDROCHLORIDE 50 MG/ML
25 INJECTION INTRAMUSCULAR; INTRAVENOUS ONCE
Status: COMPLETED | OUTPATIENT
Start: 2021-04-13 | End: 2021-04-13

## 2021-04-13 RX ORDER — METOCLOPRAMIDE HYDROCHLORIDE 5 MG/ML
10 INJECTION INTRAMUSCULAR; INTRAVENOUS ONCE
Status: COMPLETED | OUTPATIENT
Start: 2021-04-13 | End: 2021-04-13

## 2021-04-13 RX ADMIN — DIPHENHYDRAMINE HYDROCHLORIDE 25 MG: 50 INJECTION, SOLUTION INTRAMUSCULAR; INTRAVENOUS at 17:18

## 2021-04-13 RX ADMIN — METOCLOPRAMIDE 10 MG: 5 INJECTION, SOLUTION INTRAMUSCULAR; INTRAVENOUS at 17:18

## 2021-04-13 ASSESSMENT — ENCOUNTER SYMPTOMS
NAUSEA: 1
VOMITING: 1
ABDOMINAL PAIN: 1

## 2021-04-13 ASSESSMENT — MIFFLIN-ST. JEOR: SCORE: 1837.6

## 2021-04-13 NOTE — ED PROVIDER NOTES
"  History   Chief Complaint:  Abdominal Pain     HPI   Layne Blas is a 35 year old female with history of PCOS, Rh incompatibility, and thyroid disorder who presents with abdominal pain. The patient reports she has had pain in the lower right quadrant of her abdomen for the last two weeks. When she bends over, the pain is worse. Nausea has been experienced for the last two weeks, and she reports vomiting about twice daily during this period. Consistent nausea is what ultimately brought her in today. She has two boys (5 and 7), and states that symptoms are consistent with her past pregnancies. Her last period was on 2021. She reports that she has had regular, monthly periods after her IUD was removed in November.     Review of Systems   Gastrointestinal: Positive for abdominal pain, nausea and vomiting.   All other systems reviewed and are negative.    Allergies:  No known allergies    Medications:  Reglan  Seroquel  Imitrex  Viibryd    Past Medical History:    Anxiety  Depression  Previous reproduction problem  Migraines  Obesity  PCOS  Rh incompatibility  Thyroid disease      Past Surgical History:    Removal of kidney stone  parathyroidectomy    Family History:    No reported family history    Social History:  Arrives via car  Accompanied by a  in the ED    Physical Exam     Patient Vitals for the past 24 hrs:   BP Temp Temp src Pulse Resp SpO2 Height Weight   21 -- -- -- -- -- 98 % -- --   21 (!) 159/98 -- -- 97 -- 97 % -- --   21 -- -- -- -- -- 96 % -- --   21 -- -- -- -- -- 98 % -- --   21 -- -- -- -- -- 98 % -- --   21 (!) 153/103 -- -- 83 -- 97 % -- --   21 -- -- -- -- -- 98 % -- --   21 1930 (!) 154/91 -- -- 92 -- 95 % -- --   21 1736 -- -- -- -- -- 96 % -- --   21 1424 (!) 176/97 97.8  F (36.6  C) Temporal 90 14 100 % 1.575 m (5' 2\") 117.9 kg (260 lb)       Physical Exam  General: Alert " and interactive. Appears well. Cooperative and pleasant.   Eyes: The pupils are equal and round. EOMs intact. No scleral icterus.  ENT: No abnormalities to the external nose or ears. Mucous membranes moist. Posterior oropharynx is non-erythematous.  Neck: Trachea is in the midline. No nuchal rigidity.     CV: Regular rate and rhythm. S1 and S2 normal without murmur, click, gallop or rub.   Resp: Breath sounds are clear bilaterally, without rhonchi, wheezes, rales. Non-labored, no retractions or accessory muscle use.     GI: Abdomen is soft without distension. Mild tenderness in RLQ without guarding. No CVA tenderness.   MS: Moving all extremities well. Good muscle tone.   Skin: Warm and dry. No rash or lesions noted.  Neuro: Alert and oriented x 3. No focal neurologic deficits. Good strength and sensation in upper and lower extremities. Psych: Awake. Alert.  Normal affect. Appropriate interactions.  Lymph: No anterior or posterior cervical lymphadenopathy noted.    Emergency Department Course   Imaging:  OB transvaginal US   Pregnancy of indeterminate location. Complex right ovarian cyst with findings that raise concern for ovarian ectopic pregnancy, though may reflect crenulated corpus luteal cyst in the setting of an early intrauterine pregnancy or . Recommend   continued follow-up with beta hCG and ultrasound.    Findings were discussed with Dr. Lobato 7:41 PM on 2021.    Read per radiology    Laboratory:  CBC: WBC 9.2, HGB 14.6,   BMP: AWNL (Creatinine 0.69)     HCG qualitative: Positive (A)  HCG quantitative: 108    UA with microscopic: [place] 40 (A), Protein albumin 20 (A), Leukocyte esterase large (A), WBC/HPF 17 (H), RBC/HPF 3 (H), Bacteria moderate (A), Squamous epithelia 15 (H), Mucous present (A) o/w WNL   Urine culture: pending    Emergency Department Course:  Reviewed:  I reviewed nursing notes, vitals and past medical history    Assessments:   I obtained history and examined the  patient as noted above.   1946 I rechecked the patient and explained findings.   2004 Discussed with the patient and all questioned fully answered.    Interventions:  1718 Reglan 10 mg IV  1718 Benadryl 25 mg IV    Disposition:  The patient was discharged to home.       Impression & Plan       Medical Decision Making:  Layne Blas is a 35 year old female presents for evaluation of right lower quadrant abdominal pain and nausea/vomiting. Her urine HCG did come back positive. Ultrasound was obtained that shows a possible right ovarian cyst and no obvious intrauterine pregnancy. Her HCG quantitative returned at 108.  It is likely with such a low HCG that we would not see anything in the uterus on US. However, out of concern for developing ectopic pregnancy, I touched base with OB/GYN. They felt comfortable with her having beta HCG monitoring and ultrasound once her HCG is over 6000. The patient will be treated for urinary tract infection with Keflex twice daily for the next 5 days. She has no vaginal bleeding or vaginal discharge to suggest yeast, BV, or gonorrhea/chlamydia. The patient feels much improved after Reglan and Benadryl. I ordered a beta hCG for her to be drawn on Friday 5/16. I given her information for OB/GYN for follow-up. Vitals are reassuring apart from hypertension. She was given strict return precautions for persistent vomiting, worsening right lower quadrant abdominal pain, syncope, vaginal bleeding, fevers or other worrisome concerns.      Diagnosis:    ICD-10-CM    1. RLQ abdominal pain  R10.31 HCG quantitative pregnancy     Urine Culture   2. Abdominal pain in pregnancy, first trimester  O26.891     R10.9    3. Nausea and vomiting in pregnancy  O21.9    4. Acute cystitis without hematuria  N30.00        Discharge Medications:  Discharge Medication List as of 4/13/2021  8:15 PM      START taking these medications    Details   cephALEXin (KEFLEX) 500 MG capsule Take 1 capsule (500 mg) by  mouth 2 times daily for 7 days, Disp-14 capsule, R-0, Local Print      !! metoclopramide (REGLAN) 5 MG tablet Take 1 tablet (5 mg) by mouth 3 times daily as needed (Nausea or Vomiting), Disp-20 tablet, R-0, Local Print       !! - Potential duplicate medications found. Please discuss with provider.          Scribe Disclosure:  I, Aric Foster, am serving as a scribe at 5:03 PM on 4/13/2021 to document services personally performed by Lynsey Lobato PA-C based on my observations and the provider's statements to me.            Lynsey Lobato PA-C  04/13/21 [id number]

## 2021-04-13 NOTE — ED TRIAGE NOTES
"One month of RLQ pain/pressure worsening over last week. Vomiting daily over last week. Described an intense \"pressure that I fell all the way down to my hip and my knee.\" Sent in by clinic. Endorses constipation.   "

## 2021-04-14 ENCOUNTER — TELEPHONE (OUTPATIENT)
Dept: OBGYN | Facility: CLINIC | Age: 36
End: 2021-04-14

## 2021-04-14 DIAGNOSIS — O26.899 ABDOMINAL PAIN IN PREGNANCY: Primary | ICD-10-CM

## 2021-04-14 DIAGNOSIS — R10.9 ABDOMINAL PAIN IN PREGNANCY: Primary | ICD-10-CM

## 2021-04-14 LAB
BACTERIA SPEC CULT: NO GROWTH
Lab: NORMAL
SPECIMEN SOURCE: NORMAL

## 2021-04-14 NOTE — DISCHARGE INSTRUCTIONS
Come back and have your labs redrawn Friday morning. I placed an order that hopefully will work. If not, please follow up in Dr. Johnson's office for serial HCG testing.   Please Take Keflex for UTI.   Use Reglan with Benadryl for nausea.   Return for worsening pain, fevers, worsening vomiting.     Discharge Instructions  Abdominal Pain    Abdominal pain (belly pain) can be caused by many things. Your evaluation today does not show the exact cause for your pain. Your provider today has decided that it is unlikely your pain is due to a life threatening problem, or a problem requiring surgery or hospital admission. Sometimes those problems cannot be found right away, so it is very important that you follow up as directed.  Sometimes only the changes which occur over time allow the cause of your pain to be found.    Generally, every Emergency Department visit should have a follow-up clinic visit with either a primary or a specialty clinic/provider. Please follow-up as instructed by your emergency provider today. With abdominal pain, we often recommend very close follow-up, such as the following day.    ADULTS:  Return to the Emergency Department right away if:    You get an oral temperature above 102oF or as directed by your provider.  You have blood in your stools. This may be bright red or appear as black, tarry stools.    You keep vomiting (throwing up) or cannot drink liquids.  You see blood when you vomit.   You cannot have a bowel movement or you cannot pass gas.  Your stomach gets bloated or bigger.  Your skin or the whites of your eyes look yellow.  You faint.  You have bloody, frequent or painful urination (peeing).  You have new symptoms or anything that worries you.    CHILDREN:  Return to the Emergency Department right away if your child has any of the above-listed symptoms or the following:    Pushes your hand away or screams/cries when his/her belly is touched.  You notice your child is very fussy or  weak.  Your child is very tired and is too tired to eat or drink.  Your child is dehydrated.  Signs of dehydration can be:  Significant change in the amount of wet diapers/urine.  Your infant or child starts to have dry mouth and lips, or no saliva (spit) or tears.    PREGNANT WOMEN:  Return to the Emergency Department right away if you have any of the above-listed symptoms or the following:    You have bleeding, leaking fluid or passing tissue from the vagina.  You have worse pain or cramping, or pain in your shoulder or back.  You have vomiting that will not stop.  You have a temperature of 100oF or more.  Your baby is not moving as much as usual.  You faint.  You get a bad headache with or without eye problems and abdominal pain.  You have a seizure.  You have unusual discharge from your vagina and abdominal pain.    Abdominal pain is pretty common during pregnancy.  Your pain may or may not be related to your pregnancy. You should follow-up closely with your OB provider so they can evaluate you and your baby.  Until you follow-up with your regular provider, do the following:     Avoid sex and do not put anything in your vagina.  Drink clear fluids.  Only take medications approved by your provider.    MORE INFORMATION:    Appendicitis:  A possible cause of abdominal pain in any person who still has their appendix is acute appendicitis. Appendicitis is often hard to diagnose.  Testing does not always rule out early appendicitis or other causes of abdominal pain. Close follow-up with your provider and re-evaluations may be needed to figure out the reason for your abdominal pain.    Follow-up:  It is very important that you make an appointment with your clinic and go to the appointment.  If you do not follow-up with your primary provider, it may result in missing an important development which could result in permanent injury or disability and/or lasting pain.  If there is any problem keeping your appointment, call  "your provider or return to the Emergency Department.    Medications:  Take your medications as directed by your provider today.  Before using over-the-counter medications, ask your provider and make sure to take the medications as directed.  If you have any questions about medications, ask your provider.    Diet:  Resume your normal diet as much as possible, but do not eat fried, fatty or spicy foods while you have pain.  Do not drink alcohol or have caffeine.  Do not smoke tobacco.    Probiotics: If you have been given an antibiotic, you may want to also take a probiotic pill or eat yogurt with live cultures. Probiotics have \"good bacteria\" to help your intestines stay healthy. Studies have shown that probiotics help prevent diarrhea (loose stools) and other intestine problems (including C. diff infection) when you take antibiotics. You can buy these without a prescription in the pharmacy section of the store.     If you were given a prescription for medicine here today, be sure to read all of the information (including the package insert) that comes with your prescription.  This will include important information about the medicine, its side effects, and any warnings that you need to know about.  The pharmacist who fills the prescription can provide more information and answer questions you may have about the medicine.  If you have questions or concerns that the pharmacist cannot address, please call or return to the Emergency Department.       Remember that you can always come back to the Emergency Department if you are not able to see your regular provider in the amount of time listed above, if you get any new symptoms, or if there is anything that worries you.    "

## 2021-04-14 NOTE — TELEPHONE ENCOUNTER
----- Message from Sun Johnson MD sent at 4/13/2021  8:34 PM CDT -----  Regarding: f/u possible ectopic  This patient, who is not established with any OB/Gyn group, was seen in the [facility] ER 4/13/2021 with mild RLQ pain and , and nausea. She has a repeat HCG ordered for 48 hours but needs to establish for prenatal care and serial HCGs/US to evaluate for ectopic. Please reach out to her to follow up and establish care. She should be seen fri to discuss serial HCG results.     Sun Johnson MD     
Called pt, she will go to walk in lab at [facility] for HCG tomorrow evening.  Ordering HCG quant stat under Dr Crow who is on call that day.   Scheduled for Follow-up with Dr Beck on Friday to discuss Follow-up and POC.  Pt will schedule appropriate appts on Friday when she is here.    Routing to both Dr Crow and Kiran as FYI.    Poppy Pereira RN    
Ok,   
Please add her to the appointment schedule tomorrow after her quant is drawn   Dr. Rylee Crow, DO    Obstetrics and Gynecology  [facility] [place]         
Pt plans to have the quant done after clinic hours at the walk in lab at [facility] due to scheduling availability and being able to have quant done close to 48 hours after the first one.  She is on Dr Beck's schedule on Friday per Dr Johnson's advice from initial note.    Poppy Pereira RN      
negative...
Initial

## 2021-04-15 DIAGNOSIS — R10.9 ABDOMINAL PAIN IN PREGNANCY: ICD-10-CM

## 2021-04-15 DIAGNOSIS — O26.899 ABDOMINAL PAIN IN PREGNANCY: ICD-10-CM

## 2021-04-15 LAB — B-HCG SERPL-ACNC: 232 IU/L (ref 0–5)

## 2021-04-15 PROCEDURE — 84702 CHORIONIC GONADOTROPIN TEST: CPT | Performed by: FAMILY MEDICINE

## 2021-04-15 PROCEDURE — 36415 COLL VENOUS BLD VENIPUNCTURE: CPT | Performed by: FAMILY MEDICINE

## 2021-04-15 NOTE — RESULT ENCOUNTER NOTE
"Final urine culture report shows \"NO GROWTH\" and is NEGATIVE.  [facility] Emergency Dept discharge antibiotic: Cephalexin (Keflex) 500 mg capsule, 1 capsule (500 mg) by mouth 2 times daily for 7 days.  [facility] Emergency Dept discharge Rx antibiotic for UTI only (Yes/No): yes  Patient took antibiotic within 3 days prior to urine culture collection (Yes/no): Unknown  Recommendations in treatment per [facility] ED Lab result Urine culture protocol.  "

## 2021-04-16 ENCOUNTER — OFFICE VISIT (OUTPATIENT)
Dept: OBGYN | Facility: CLINIC | Age: 36
End: 2021-04-16
Payer: COMMERCIAL

## 2021-04-16 VITALS
WEIGHT: 262 LBS | DIASTOLIC BLOOD PRESSURE: 76 MMHG | HEIGHT: 62 IN | HEART RATE: 84 BPM | SYSTOLIC BLOOD PRESSURE: 128 MMHG | BODY MASS INDEX: 48.21 KG/M2

## 2021-04-16 DIAGNOSIS — R03.0 WHITE COAT SYNDROME WITHOUT DIAGNOSIS OF HYPERTENSION: ICD-10-CM

## 2021-04-16 DIAGNOSIS — Z34.90 EARLY STAGE OF PREGNANCY: Primary | ICD-10-CM

## 2021-04-16 PROCEDURE — 99204 OFFICE O/P NEW MOD 45 MIN: CPT | Performed by: OBSTETRICS & GYNECOLOGY

## 2021-04-16 SDOH — HEALTH STABILITY: MENTAL HEALTH: HOW OFTEN DO YOU HAVE A DRINK CONTAINING ALCOHOL?: NOT ASKED

## 2021-04-16 SDOH — HEALTH STABILITY: MENTAL HEALTH: HOW OFTEN DO YOU HAVE 6 OR MORE DRINKS ON ONE OCCASION?: NOT ASKED

## 2021-04-16 SDOH — HEALTH STABILITY: MENTAL HEALTH: HOW MANY STANDARD DRINKS CONTAINING ALCOHOL DO YOU HAVE ON A TYPICAL DAY?: NOT ASKED

## 2021-04-16 ASSESSMENT — MIFFLIN-ST. JEOR: SCORE: 1846.67

## 2021-04-16 NOTE — PROGRESS NOTES
SUBJECTIVE:       Layne Blas is a 35 year old female  who presents to clinic today for ED follow up for RLQ pain with nausea, and was found to have a positive HCG. She had serial HCGs over 48 hours:    Component      Latest Ref Rng & Units 2021 4/15/2021   HCG Quantitative Serum      0 - 5 IU/L 108 (H) 232 (H)       Today, reports feeling quite a bit better with the Reglan she was given. History of reflux, has not been on anything for this recently. RLQ pain is not bothering her at this time. She was hoping for pregnancy, and is happy about this.    History of IUD removal in , regular once a month. Last period . Preg test 2.5 weeks prior to ED was negative.    She did have elevated BPs in the ED, normal here today. States she has had white coat hypertension in the past, but no issues in her pregnancies and no diagnosis of hypertension otherwise.    Problem list and histories reviewed & adjusted, as indicated.  Additional history: as documented.    Patient Active Problem List   Diagnosis     Indication for care in labor or delivery     Vaginal delivery      (spontaneous vaginal delivery)     Major depression, recurrent (H)     Past Surgical History:   Procedure Laterality Date     C REMOVAL OF KIDNEY STONE       PARATHYROIDECTOMY        Social History     Tobacco Use     Smoking status: Never Smoker     Smokeless tobacco: Never Used   Substance Use Topics     Alcohol use: Not Currently     Comment: occ      Problem (# of Occurrences) Relation (Name,Age of Onset)    Cancer (1) Paternal Grandfather            cephALEXin (KEFLEX) 500 MG capsule, Take 1 capsule (500 mg) by mouth 2 times daily for 7 days  metoclopramide (REGLAN) 5 MG tablet, Take 1 tablet (5 mg) by mouth 3 times daily as needed (Nausea or Vomiting)  SUMAtriptan Succinate (IMITREX PO),   VITAMIN D, CHOLECALCIFEROL, PO, Take by mouth daily    No current facility-administered medications on file prior to visit.  "    Allergies   Allergen Reactions     Adhesive Tape Rash       ROS:  Negative other than as noted above.    We reviewed her ultrasound images together in the exam room. Her  accompanied her today.    OBJECTIVE:     Exam:  /76   Pulse 84   Ht 1.575 m (5' 2\")   Wt 118.8 kg (262 lb)   LMP 02/20/2021   Breastfeeding No   BMI 47.92 kg/m      Constitutional:  Appearance: Well nourished, well developed alert, in no acute distress  Chest:  Respiratory Effort:  Breathing unlabored  Neurologic/Psychiatric:  Mental Status:  Oriented X3       In-Clinic Test Results:  Results for orders placed or performed in visit on 04/15/21 (from the past 24 hour(s))   HCG Quantitative Pregnancy, Blood ([id number])   Result Value Ref Range    HCG Quantitative Serum 232 (H) 0 - 5 IU/L       ASSESSMENT/PLAN:                                                        ICD-10-CM    1. Early stage of pregnancy  Z34.90 US OB <14 Weeks w Transvaginal Single   2. White coat syndrome without diagnosis of hypertension  R03.0          -Reviewed that HCG levels are rising appropriately, meaning that ectopic pregnancy is less likely. However, we cannot definitively rule out ectopic until she is further along, and intrauterine pregnancy can be documented. Thus, TVUS for 3 weeks was ordered. If that does show intrauterine pregnancy, and she has had no other issues, we will initiate prenatal care at that point in time.    -Ectopic precautions discussed. If severe pain, shoulder strap pain, etc, needs to present immediately to the ED.    -Briefly discussed mental health history: states this was most recently diagnosed as borderline personality disorder, and manifests most often as anxiety. She was on Seroquel for this in the past, but stopped it when she started attempting pregnancy. Discussed mental health resources available should she need those. OK to restart medicine in conjunction with a mental health professional should she need that.    45 " minutes spent on the date of the encounter doing chart review, review of test results, interpretation of tests, patient visit and documentation     Gale Beck MD  [facility]

## 2021-05-05 ENCOUNTER — HOSPITAL ENCOUNTER (EMERGENCY)
Facility: CLINIC | Age: 36
Discharge: HOME | End: 2021-05-05
Attending: EMERGENCY MEDICINE | Admitting: EMERGENCY MEDICINE
Payer: COMMERCIAL

## 2021-05-05 ENCOUNTER — MYC MEDICAL ADVICE (OUTPATIENT)
Dept: OBGYN | Facility: CLINIC | Age: 36
End: 2021-05-05

## 2021-05-05 ENCOUNTER — APPOINTMENT (OUTPATIENT)
Dept: ULTRASOUND IMAGING | Facility: CLINIC | Age: 36
End: 2021-05-05
Attending: EMERGENCY MEDICINE
Payer: COMMERCIAL

## 2021-05-05 ENCOUNTER — TELEPHONE (OUTPATIENT)
Dept: OBGYN | Facility: CLINIC | Age: 36
End: 2021-05-05

## 2021-05-05 VITALS
RESPIRATION RATE: 16 BRPM | HEART RATE: 69 BPM | SYSTOLIC BLOOD PRESSURE: 146 MMHG | TEMPERATURE: 97.9 F | DIASTOLIC BLOOD PRESSURE: 95 MMHG | OXYGEN SATURATION: 100 %

## 2021-05-05 DIAGNOSIS — O21.0 HYPEREMESIS GRAVIDARUM: Primary | ICD-10-CM

## 2021-05-05 DIAGNOSIS — O21.0 HYPEREMESIS GRAVIDARUM: ICD-10-CM

## 2021-05-05 DIAGNOSIS — R10.31 ABDOMINAL PAIN, RIGHT LOWER QUADRANT: ICD-10-CM

## 2021-05-05 DIAGNOSIS — N83.11 CORPUS LUTEUM CYST OF RIGHT OVARY: ICD-10-CM

## 2021-05-05 DIAGNOSIS — O21.9 NAUSEA AND VOMITING IN PREGNANCY: ICD-10-CM

## 2021-05-05 LAB
ALBUMIN UR-MCNC: 50 MG/DL
ANION GAP SERPL CALCULATED.3IONS-SCNC: 11 MMOL/L (ref 3–14)
APPEARANCE UR: CLEAR
B-HCG SERPL-ACNC: ABNORMAL IU/L (ref 0–5)
BACTERIA #/AREA URNS HPF: ABNORMAL /HPF
BASOPHILS # BLD AUTO: 0 10E9/L (ref 0–0.2)
BASOPHILS NFR BLD AUTO: 0.2 %
BILIRUB UR QL STRIP: NEGATIVE
BUN SERPL-MCNC: 6 MG/DL (ref 7–30)
CALCIUM SERPL-MCNC: 8.9 MG/DL (ref 8.5–10.1)
CHLORIDE SERPL-SCNC: 106 MMOL/L (ref 94–109)
CO2 SERPL-SCNC: 20 MMOL/L (ref 20–32)
COLOR UR AUTO: YELLOW
CREAT SERPL-MCNC: 0.58 MG/DL (ref 0.52–1.04)
DIFFERENTIAL METHOD BLD: ABNORMAL
EOSINOPHIL # BLD AUTO: 0 10E9/L (ref 0–0.7)
EOSINOPHIL NFR BLD AUTO: 0 %
ERYTHROCYTE [DISTWIDTH] IN BLOOD BY AUTOMATED COUNT: 12.5 % (ref 10–15)
GFR SERPL CREATININE-BSD FRML MDRD: >90 ML/MIN/{1.73_M2}
GLUCOSE SERPL-MCNC: 111 MG/DL (ref 70–99)
GLUCOSE UR STRIP-MCNC: NEGATIVE MG/DL
HCT VFR BLD AUTO: 43.1 % (ref 35–47)
HGB BLD-MCNC: 14.9 G/DL (ref 11.7–15.7)
HGB UR QL STRIP: NEGATIVE
IMM GRANULOCYTES # BLD: 0.1 10E9/L (ref 0–0.4)
IMM GRANULOCYTES NFR BLD: 0.5 %
KETONES UR STRIP-MCNC: >150 MG/DL
LEUKOCYTE ESTERASE UR QL STRIP: ABNORMAL
LYMPHOCYTES # BLD AUTO: 0.7 10E9/L (ref 0.8–5.3)
LYMPHOCYTES NFR BLD AUTO: 6.7 %
MCH RBC QN AUTO: 30.6 PG (ref 26.5–33)
MCHC RBC AUTO-ENTMCNC: 34.6 G/DL (ref 31.5–36.5)
MCV RBC AUTO: 89 FL (ref 78–100)
MONOCYTES # BLD AUTO: 0.4 10E9/L (ref 0–1.3)
MONOCYTES NFR BLD AUTO: 4.1 %
MUCOUS THREADS #/AREA URNS LPF: PRESENT /LPF
NEUTROPHILS # BLD AUTO: 9 10E9/L (ref 1.6–8.3)
NEUTROPHILS NFR BLD AUTO: 88.5 %
NITRATE UR QL: NEGATIVE
NRBC # BLD AUTO: 0 10*3/UL
NRBC BLD AUTO-RTO: 0 /100
PH UR STRIP: 8 PH (ref 5–7)
PLATELET # BLD AUTO: 252 10E9/L (ref 150–450)
POTASSIUM SERPL-SCNC: 3.3 MMOL/L (ref 3.4–5.3)
RBC # BLD AUTO: 4.87 10E12/L (ref 3.8–5.2)
RBC #/AREA URNS AUTO: 4 /HPF (ref 0–2)
SODIUM SERPL-SCNC: 137 MMOL/L (ref 133–144)
SOURCE: ABNORMAL
SP GR UR STRIP: 1.03 (ref 1–1.03)
SQUAMOUS #/AREA URNS AUTO: 7 /HPF (ref 0–1)
UROBILINOGEN UR STRIP-MCNC: 4 MG/DL (ref 0–2)
WBC # BLD AUTO: 10.2 10E9/L (ref 4–11)
WBC #/AREA URNS AUTO: 5 /HPF (ref 0–5)

## 2021-05-05 PROCEDURE — 81001 URINALYSIS AUTO W/SCOPE: CPT | Performed by: EMERGENCY MEDICINE

## 2021-05-05 PROCEDURE — 80048 BASIC METABOLIC PNL TOTAL CA: CPT | Performed by: EMERGENCY MEDICINE

## 2021-05-05 PROCEDURE — 96375 TX/PRO/DX INJ NEW DRUG ADDON: CPT

## 2021-05-05 PROCEDURE — 96361 HYDRATE IV INFUSION ADD-ON: CPT

## 2021-05-05 PROCEDURE — 96376 TX/PRO/DX INJ SAME DRUG ADON: CPT

## 2021-05-05 PROCEDURE — 84702 CHORIONIC GONADOTROPIN TEST: CPT | Performed by: EMERGENCY MEDICINE

## 2021-05-05 PROCEDURE — 99285 EMERGENCY DEPT VISIT HI MDM: CPT | Mod: 25

## 2021-05-05 PROCEDURE — 85025 COMPLETE CBC W/AUTO DIFF WBC: CPT | Performed by: EMERGENCY MEDICINE

## 2021-05-05 PROCEDURE — 96374 THER/PROPH/DIAG INJ IV PUSH: CPT

## 2021-05-05 PROCEDURE — 258N000003 HC RX IP 258 OP 636: Performed by: EMERGENCY MEDICINE

## 2021-05-05 PROCEDURE — 250N000011 HC RX IP 250 OP 636: Performed by: EMERGENCY MEDICINE

## 2021-05-05 PROCEDURE — 76801 OB US < 14 WKS SINGLE FETUS: CPT

## 2021-05-05 RX ORDER — METOCLOPRAMIDE 5 MG/1
5-10 TABLET ORAL 4 TIMES DAILY PRN
Qty: 20 TABLET | Refills: 0 | Status: SHIPPED | OUTPATIENT
Start: 2021-05-05 | End: 2021-05-06

## 2021-05-05 RX ORDER — ONDANSETRON 4 MG/1
4 TABLET, ORALLY DISINTEGRATING ORAL EVERY 6 HOURS PRN
Qty: 30 TABLET | Refills: 1 | Status: SHIPPED | OUTPATIENT
Start: 2021-05-05 | End: 2021-06-15

## 2021-05-05 RX ORDER — METOCLOPRAMIDE HYDROCHLORIDE 5 MG/ML
5 INJECTION INTRAMUSCULAR; INTRAVENOUS ONCE
Status: COMPLETED | OUTPATIENT
Start: 2021-05-05 | End: 2021-05-05

## 2021-05-05 RX ORDER — METOCLOPRAMIDE HYDROCHLORIDE 5 MG/ML
10 INJECTION INTRAMUSCULAR; INTRAVENOUS ONCE
Status: DISCONTINUED | OUTPATIENT
Start: 2021-05-05 | End: 2021-05-05

## 2021-05-05 RX ORDER — DIPHENHYDRAMINE HYDROCHLORIDE 50 MG/ML
25 INJECTION INTRAMUSCULAR; INTRAVENOUS ONCE
Status: COMPLETED | OUTPATIENT
Start: 2021-05-05 | End: 2021-05-05

## 2021-05-05 RX ORDER — SODIUM CHLORIDE 9 MG/ML
INJECTION, SOLUTION INTRAVENOUS CONTINUOUS
Status: DISCONTINUED | OUTPATIENT
Start: 2021-05-05 | End: 2021-05-05 | Stop reason: HOSPADM

## 2021-05-05 RX ORDER — DEXTROSE, SODIUM CHLORIDE, SODIUM LACTATE, POTASSIUM CHLORIDE, AND CALCIUM CHLORIDE 5; .6; .31; .03; .02 G/100ML; G/100ML; G/100ML; G/100ML; G/100ML
1000 INJECTION, SOLUTION INTRAVENOUS ONCE
Status: CANCELLED | OUTPATIENT
Start: 2021-05-05 | End: 2021-05-05

## 2021-05-05 RX ORDER — PROMETHAZINE HYDROCHLORIDE 25 MG/1
25 SUPPOSITORY RECTAL EVERY 6 HOURS PRN
Qty: 12 SUPPOSITORY | Refills: 2 | Status: SHIPPED | OUTPATIENT
Start: 2021-05-05 | End: 2021-10-14

## 2021-05-05 RX ORDER — ONDANSETRON 2 MG/ML
4 INJECTION INTRAMUSCULAR; INTRAVENOUS ONCE
Status: CANCELLED | OUTPATIENT
Start: 2021-05-05 | End: 2021-05-05

## 2021-05-05 RX ADMIN — METOCLOPRAMIDE HYDROCHLORIDE 5 MG: 5 INJECTION INTRAMUSCULAR; INTRAVENOUS at 11:52

## 2021-05-05 RX ADMIN — DIPHENHYDRAMINE HYDROCHLORIDE 25 MG: 50 INJECTION, SOLUTION INTRAMUSCULAR; INTRAVENOUS at 10:24

## 2021-05-05 RX ADMIN — SODIUM CHLORIDE 1000 ML: 9 INJECTION, SOLUTION INTRAVENOUS at 10:13

## 2021-05-05 RX ADMIN — METOCLOPRAMIDE HYDROCHLORIDE 5 MG: 5 INJECTION INTRAMUSCULAR; INTRAVENOUS at 10:20

## 2021-05-05 ASSESSMENT — ENCOUNTER SYMPTOMS
NAUSEA: 1
DIARRHEA: 0
FEVER: 0
CHILLS: 0
ABDOMINAL PAIN: 1
VOMITING: 1

## 2021-05-05 NOTE — ED TRIAGE NOTES
Pt is pregnant. LMP 2/20. Pt has been unable to keep food or water down since 4am today. Pt has hx of hyperemesis with previous pregnancies. ABC intact.

## 2021-05-05 NOTE — TELEPHONE ENCOUNTER
Pt responded to mychart.  Will call to set up other infusions.    Rx as below called into preferred pharmacy.  Also advised to call and schedule NPN appts.    Poppy Pereira RN

## 2021-05-05 NOTE — TELEPHONE ENCOUNTER
Yes, pt has US on 5/10.  She plans to make her NPN appts after that once she knows how far along she is.    Pt scheduled for infusion this afternoon at 2:30.  She needs to know time/where to go for this.    Attempted to call pt regarding this, LM for her to CB.  Need to know her preferred pharmacy to send Rx to and review below advise.    Poppy Pereira RN

## 2021-05-05 NOTE — TELEPHONE ENCOUNTER
Pt calling, newly pregnant, EDC unknown.  Had US in ER that did not show a pregnancy, has had quants that are rising appropriately.    She can't keep water down.  Has a hx of hyperemesis.    Can't even try the Reglan as she cannot keep water down.  Urine starting to decrease slightly.    She is wondering if she can get IFV and IV nausea medication.  Had hyperemesis with previous pregnancy and needed IVF.    Can we get IVF ordered either through the infusion center or home health?  In the meantime can we try an alternative nausea Rx?  ODT Zofran or phenergen supp?    Sending to both on call and primary  Please advise.    Poppy Pereira RN

## 2021-05-05 NOTE — ED PROVIDER NOTES
History   Chief Complaint  Nausea and Vomiting    HPI  Layne Blas is a 35 year old female that is approximately 2 months pregnant (, LMP on 2021) with a history of hyperemesis gravidarum who presents for evaluation of nausea and vomiting. The patient reports that she has been dealing with this throughout the duration of her pregnancy, as well as with both of her previous pregnancies. She started vomiting at 0400 this morning and notes episodes of dry heaving every 10-15 minutes since. She also endorsees some associated generalized abdominal pain. Denies any vaginal bleeding, vaginal discharged, black/bloody emesis, diarrhea, fevers, or chills. Denies any concern for STD.     Review of Systems   Constitutional: Negative for chills and fever.   Gastrointestinal: Positive for abdominal pain, nausea and vomiting. Negative for diarrhea.   Genitourinary: Negative for vaginal bleeding and vaginal discharge.   All other systems reviewed and are negative.    Allergies  The patient has no known drug allergies.     Medications  Reglan  Imitrex    Past Medical History  Anxiety  Depression  Migraines  Obesity  PCOS  Thyroid disease  GERD  Vitamin D deficiency    Past Surgical History  Parathyroidectomy  Kidney stone removal    Social History  Presents to the ED with her mother.  Nonsmoker.  No alcohol use.    Physical Exam     Patient Vitals for the past 24 hrs:   BP Temp Temp src Pulse Resp SpO2   21 1210 (!) 146/95 -- -- 69 -- 100 %   21 1100 (!) 132/91 -- -- -- -- --   21 0950 (!) 154/88 97.9  F (36.6  C) Temporal 83 16 100 %     Physical Exam  General: Adult female sitting upright  Eyes: PERRL, Conjunctive within normal limits  ENT: Moist mucous membranes, oropharynx clear.   CV: Normal S1S2, no murmur, rub or gallop. Regular rate and rhythm  Resp: Clear to auscultation bilaterally, no wheezes, rales or rhonchi. Normal respiratory effort.  GI: Abdomen is soft and nondistended.  Mild  tenderness to deep palpation in the right lower abdomen/pelvis. No palpable masses. No rebound or guarding.  MSK: No edema. Nontender. Normal active range of motion.  Skin: Warm and dry. No rashes or lesions or ecchymoses on visible skin.  Neuro: Alert and oriented. Responds appropriately to all questions and commands. No focal findings appreciated. Normal muscle tone.  Psych: Normal mood and affect. Pleasant.    Emergency Department Course   Imaging:  US OB, <14 weeks w Transvaginal:  Single living intrauterine gestation at 6 weeks 5 days, EDC 12/24/2021.  As per radiology.     Laboratory:  CBC + differential: WBC: 10.2, HGB: 14.9, PLT: 252  BMP: Glucose 111 (H), Potassium 3.3 (L), BUN 6 (L), o/w WNL (Creatinine: 0.58)  HCG quantitative pregnancy blood: 24,084 (H)    UA with Microscopic: ketones > 150 (A), pH 8.0 (H), protein albumin 50 (A), urobilinogen 4.0 (H), leukocyte esterase trace (A), RBC 4 (H), bacteria few (A), squamous epithelial 7 (H), mucous present (A), o/w WNL    Emergency Department Course:  Reviewed:  I reviewed nursing notes, vitals and past medical history    Assessments:  0956 I physically examined the patient as documented above.    1145 I rechecked the patient.  He is feeling improved.  She is tolerating p.o.    Interventions:  1013 NS 1L IV  1020 Reglan 5 mg IV  1024 Benadryl 25 mg IV  1152 Reglan 5 mg IV    Disposition:  The patient was discharged to home.     Impression & Plan   Medical Decision Making:  Layne Blas is a 35 year old female who presents for evaluation of nausea and vomiting.  She is currently pregnant.  He is also had ongoing right lower quadrant abdominal pain.  Her symptoms are most likely secondary to hyperemesis, I considered a broad differential diagnosis for this patient including viral gastroenteritis, food poisoning, bowel obstruction, intra-abdominal infection such as colitis, cholecystitis, UTI, pyelonephritis, appendicitis, etc. topic pregnancy also  considered.  There are no signs of worrisome intra-abdominal pathologies detected during the visit today.  The patient mild right lower quadrant tenderness to palpation, in the adnexal region, without rebound, guarding, or marked tenderness to palpation.   OB Ultrasound can be seen above.  There is a small corpus luteum cyst that may be causing her mild discomfort, but the density is intrauterine.  Electrolytes are unremarkable.  She does not appear clinically dehydrated.  Long duration of right lower quadrant pain, appendicitis seems extremely unlikely and CT imaging is unlikely be helpful at this time.  Supportive outpatient management is therefore indicated.  Abdominal pain precautions are given for home.    It was discussed with the patient to return to the ED for blood in stool, increasing pain, or fevers more than 102.   Feels much improved after interventions in ED.  I discussed strategies for dealing with nausea and vomiting of pregnancy.  OB is involved with her care and has prescribed other antiemetics already.  I believe all of her symptoms are at this point explained by the pregnancy and nausea and vomiting of pregnancy.     Diagnosis:    ICD-10-CM    1. Nausea and vomiting in pregnancy  O21.9    2. Abdominal pain, right lower quadrant  R10.31    3. Corpus luteum cyst of right ovary  N83.11      Discharge Medications:  Discharge Medication List as of 5/5/2021  1:30 PM      START taking these medications    Details   ondansetron (ZOFRAN-ODT) 4 MG ODT tab Take 1 tablet (4 mg) by mouth every 6 hours as needed for nausea, Disp-30 tablet, R-1, E-Prescribe      promethazine (PHENERGAN) 25 MG suppository Place 1 suppository (25 mg) rectally every 6 hours as needed for nausea, Disp-12 suppository, R-2, E-Prescribe           Scribe Disclosure:  RUBY, Pacheco Elias, am serving as a scribe at 9:53 AM on 5/5/2021 to document services personally performed by Keke Dawson MD based on my observations and the  provider's statements to me.      Keke Dawson MD  05/05/21 [id number]

## 2021-05-05 NOTE — TELEPHONE ENCOUNTER
Reviewing chart, pt went into the ER before being advised of below.    Infusion cancelled for today.  Pt can call to set up further infusions.    Poppy Pereira RN

## 2021-05-05 NOTE — TELEPHONE ENCOUNTER
Orders addended, labs ordered.  Pt sent mychart with information below and asked to follow-up by responding or calling and speaking with a nurse.    Poppy Pereira RN

## 2021-05-05 NOTE — TELEPHONE ENCOUNTER
1.  She needs a follow up ultrasound, is this scheduled?  2.  OK to order IVF and zofran per the usual at infusion center or with home health--for now, just do 3 visits. The ideal goal (please let her know) is to have this be a short term bridge to find a sustainable regimen for home that will result in her NOT needing IVF.) I would also like tsh with reflex, CMP--can be drawn at infusion center.  3.  Call in phenergan supp 25 mg every 6 hours as needed. 1 box with 2 refills. Also Zofran 4 mg ODT every 6 hours as needed., 30 with 1 refill. This should ideally be ADDED to the other meds, so start reglan back on a schedule as soon as she can after starting the phenergan, then can use Zofran as needed. As she feels better, she can use Reglan and Zofran during the day and phenergan at night only if needed.    Also recommend (when able, once she's had fluids to avoid needing them again):  Start by eating a little something before getting out of bed in the morning and continue with snacks every 2 hours throughout the day. Be sure to drink plenty of water. It is helpful to take small sips of water, about a tablespoon at a time, every 10-15 minutes rather than drinking a lot at one time. DO NOT DRINK AT THE SAME TIME as eating. The small sips of fluids are more important than food, as dehydration will exacerbate all symptoms.    Gale Beck MD

## 2021-05-06 ENCOUNTER — HOSPITAL ENCOUNTER (EMERGENCY)
Facility: CLINIC | Age: 36
Discharge: HOME | End: 2021-05-06
Attending: EMERGENCY MEDICINE | Admitting: EMERGENCY MEDICINE
Payer: COMMERCIAL

## 2021-05-06 VITALS
TEMPERATURE: 97.7 F | OXYGEN SATURATION: 100 % | DIASTOLIC BLOOD PRESSURE: 93 MMHG | HEART RATE: 83 BPM | RESPIRATION RATE: 20 BRPM | SYSTOLIC BLOOD PRESSURE: 159 MMHG

## 2021-05-06 DIAGNOSIS — O21.0 HYPEREMESIS GRAVIDARUM: ICD-10-CM

## 2021-05-06 LAB
ALBUMIN SERPL-MCNC: 4.1 G/DL (ref 3.4–5)
ALP SERPL-CCNC: 61 U/L (ref 40–150)
ALT SERPL W P-5'-P-CCNC: 25 U/L (ref 0–50)
ANION GAP SERPL CALCULATED.3IONS-SCNC: 10 MMOL/L (ref 3–14)
AST SERPL W P-5'-P-CCNC: 11 U/L (ref 0–45)
BILIRUB DIRECT SERPL-MCNC: 0.2 MG/DL (ref 0–0.2)
BILIRUB SERPL-MCNC: 0.9 MG/DL (ref 0.2–1.3)
BUN SERPL-MCNC: 7 MG/DL (ref 7–30)
CALCIUM SERPL-MCNC: 9.1 MG/DL (ref 8.5–10.1)
CHLORIDE SERPL-SCNC: 106 MMOL/L (ref 94–109)
CO2 SERPL-SCNC: 20 MMOL/L (ref 20–32)
CREAT SERPL-MCNC: 0.62 MG/DL (ref 0.52–1.04)
ERYTHROCYTE [DISTWIDTH] IN BLOOD BY AUTOMATED COUNT: 12.6 % (ref 10–15)
GFR SERPL CREATININE-BSD FRML MDRD: >90 ML/MIN/{1.73_M2}
GLUCOSE SERPL-MCNC: 119 MG/DL (ref 70–99)
HCT VFR BLD AUTO: 44.8 % (ref 35–47)
HGB BLD-MCNC: 15.6 G/DL (ref 11.7–15.7)
LIPASE SERPL-CCNC: 100 U/L (ref 73–393)
MAGNESIUM SERPL-MCNC: 2 MG/DL (ref 1.6–2.3)
MCH RBC QN AUTO: 31.1 PG (ref 26.5–33)
MCHC RBC AUTO-ENTMCNC: 34.8 G/DL (ref 31.5–36.5)
MCV RBC AUTO: 89 FL (ref 78–100)
PLATELET # BLD AUTO: 282 10E9/L (ref 150–450)
POTASSIUM SERPL-SCNC: 3 MMOL/L (ref 3.4–5.3)
PROT SERPL-MCNC: 8.1 G/DL (ref 6.8–8.8)
RBC # BLD AUTO: 5.02 10E12/L (ref 3.8–5.2)
SODIUM SERPL-SCNC: 136 MMOL/L (ref 133–144)
WBC # BLD AUTO: 12.3 10E9/L (ref 4–11)

## 2021-05-06 PROCEDURE — 96375 TX/PRO/DX INJ NEW DRUG ADDON: CPT

## 2021-05-06 PROCEDURE — 83690 ASSAY OF LIPASE: CPT | Performed by: EMERGENCY MEDICINE

## 2021-05-06 PROCEDURE — C9113 INJ PANTOPRAZOLE SODIUM, VIA: HCPCS | Performed by: EMERGENCY MEDICINE

## 2021-05-06 PROCEDURE — 80076 HEPATIC FUNCTION PANEL: CPT | Performed by: EMERGENCY MEDICINE

## 2021-05-06 PROCEDURE — 258N000003 HC RX IP 258 OP 636: Performed by: EMERGENCY MEDICINE

## 2021-05-06 PROCEDURE — 250N000011 HC RX IP 250 OP 636: Performed by: EMERGENCY MEDICINE

## 2021-05-06 PROCEDURE — 85027 COMPLETE CBC AUTOMATED: CPT | Performed by: EMERGENCY MEDICINE

## 2021-05-06 PROCEDURE — 99284 EMERGENCY DEPT VISIT MOD MDM: CPT | Mod: 25

## 2021-05-06 PROCEDURE — 96361 HYDRATE IV INFUSION ADD-ON: CPT

## 2021-05-06 PROCEDURE — 83735 ASSAY OF MAGNESIUM: CPT | Performed by: EMERGENCY MEDICINE

## 2021-05-06 PROCEDURE — 96365 THER/PROPH/DIAG IV INF INIT: CPT

## 2021-05-06 PROCEDURE — 80048 BASIC METABOLIC PNL TOTAL CA: CPT | Performed by: EMERGENCY MEDICINE

## 2021-05-06 RX ORDER — DIPHENHYDRAMINE HYDROCHLORIDE 50 MG/ML
25 INJECTION INTRAMUSCULAR; INTRAVENOUS ONCE
Status: COMPLETED | OUTPATIENT
Start: 2021-05-06 | End: 2021-05-06

## 2021-05-06 RX ORDER — METOCLOPRAMIDE 5 MG/1
5-10 TABLET ORAL 4 TIMES DAILY
Qty: 30 TABLET | Refills: 3 | Status: SHIPPED | OUTPATIENT
Start: 2021-05-06 | End: 2021-06-01

## 2021-05-06 RX ORDER — POTASSIUM CHLORIDE 7.45 MG/ML
10 INJECTION INTRAVENOUS ONCE
Status: COMPLETED | OUTPATIENT
Start: 2021-05-06 | End: 2021-05-06

## 2021-05-06 RX ORDER — ONDANSETRON 2 MG/ML
4 INJECTION INTRAMUSCULAR; INTRAVENOUS
Status: COMPLETED | OUTPATIENT
Start: 2021-05-06 | End: 2021-05-06

## 2021-05-06 RX ORDER — METOCLOPRAMIDE HYDROCHLORIDE 5 MG/ML
10 INJECTION INTRAMUSCULAR; INTRAVENOUS ONCE
Status: COMPLETED | OUTPATIENT
Start: 2021-05-06 | End: 2021-05-06

## 2021-05-06 RX ADMIN — ONDANSETRON 4 MG: 2 INJECTION INTRAMUSCULAR; INTRAVENOUS at 10:45

## 2021-05-06 RX ADMIN — SODIUM CHLORIDE 1000 ML: 9 INJECTION, SOLUTION INTRAVENOUS at 10:45

## 2021-05-06 RX ADMIN — POTASSIUM CHLORIDE 10 MEQ: 7.46 INJECTION, SOLUTION INTRAVENOUS at 13:31

## 2021-05-06 RX ADMIN — PANTOPRAZOLE SODIUM 40 MG: 40 INJECTION, POWDER, FOR SOLUTION INTRAVENOUS at 12:05

## 2021-05-06 RX ADMIN — METOCLOPRAMIDE HYDROCHLORIDE 10 MG: 5 INJECTION INTRAMUSCULAR; INTRAVENOUS at 12:05

## 2021-05-06 RX ADMIN — DIPHENHYDRAMINE HYDROCHLORIDE 25 MG: 50 INJECTION, SOLUTION INTRAMUSCULAR; INTRAVENOUS at 12:05

## 2021-05-06 ASSESSMENT — ENCOUNTER SYMPTOMS
NAUSEA: 1
VOMITING: 1

## 2021-05-06 NOTE — ED TRIAGE NOTES
ABCs intact. Pt is about 6 weeks pregnant. Pt was seen yesterday in the ER for n/v. Pt has not been able to  prescriptions yet. Pt c/o ongoing n/v.

## 2021-05-06 NOTE — TELEPHONE ENCOUNTER
Pt messaging for refill of Reglan, she didn't get it when she left the ER yesterday.  Please review order below.    She is trying to get in for IV infusion today, but states she is unable to keep anything down, is having panic attacks.  All medications have been coming back up, has not gotten the suppository yet.    I did send her all of the listed information regarding medications and hydration yesterday.    Poppy Pereira RN

## 2021-05-06 NOTE — ED PROVIDER NOTES
History   Chief Complaint:  Nausea and vomiting     The history is provided by medical records and the patient.      Layne Blas is a 35 year old  currently 6 weeks 6 days pregnant female with history of hyperemesis gravidarum and Gastroesophageal reflux disease who presents with nausea and emesis. The patient notes a long standing history of hyperemesis gravidarum throughout her last several pregnancies. She has been following with her OB GYN who has given her oral Reglan. She was seen in the ER yesterday for acute exacerbation of her hyperemesis. The work up was performed as noted below and she was given IV Reglan and Benadryl with resolution of her nausea. She was able to tolerate PO water. She states she was discharged to home with Zofran and a Phenergan suppository, but she was unable to pick these up from pharmacy. She returns to the ER today for recurrent nausea and emesis. She notes that she has a burning sensation in her throat which makes her quite anxious.     ER Visit 5/15/21  5/5/21  US OB, <14 weeks w Transvaginal:  Single living intrauterine gestation at 6 weeks 5 days, EDC 2021.      CBC: WBC: 10.2, HGB: 14.9, PLT: 252  BMP: Glucose 111 (H), Potassium 3.3 (L), BUN 6 (L), o/w WNL (Creatinine: 0.58)  HCG quantitative pregnancy blood: 24,084 (H)     UA with Microscopic: ketones > 150 (A), pH 8.0 (H), protein albumin 50 (A), urobilinogen 4.0 (H), leukocyte esterase trace (A), RBC 4 (H), bacteria few (A), squamous epithelial 7 (H), mucous present (A), o/w WNL    Review of Systems   Gastrointestinal: Positive for nausea and vomiting.        Burning sensation in throat.   All other systems reviewed and are negative.    Allergies:  Adhesive Tape    Medications:  Imitrex  Vitamin D3  Reglan  Zofran  Companzine suppository    Past Medical History:    Anxiety   Depression  Gastroesophageal reflux disease   Hyperemesis gravidarum   Migraines  Obesity   PCOS  Social anxiety disorder   Thyroid  disease     vitamin D deficiency    Past Surgical History:    Removal of kidney stone  Parathyroidectomy     Social History:  Presents to the ER with mother.     Physical Exam     Patient Vitals for the past 24 hrs:   BP Temp Temp src Pulse Resp SpO2   05/06/21 1330 (!) 159/93 -- -- 83 -- --   05/06/21 1230 -- -- -- -- -- 100 %   05/06/21 1215 (!) 182/104 -- -- -- -- 100 %   05/06/21 1210 (!) 182/104 -- -- 64 20 99 %   05/06/21 1022 (!) 159/115 97.7  F (36.5  C) Tympanic 100 22 99 %       Physical Exam  General: appears uncomfortable   Head: The scalp, face, and head appear normal  Eyes: The pupils are equal, round, and reactive to light. Conjunctivae and sclerae are normal  Neck: Normal range of motion.   CV: tachycardiac but regular    Resp: Lungs are clear without wheezes or rales. No respiratory distress.   GI: Abdomen is soft, no rigidity, guarding, or rebound. No distension. No tenderness to palpation in any quadrant.     MS: Normal tone. Joints grossly normal without effusions. No asymmetric leg swelling, calf or thigh tenderness.    Skin: No rash or lesions noted. Normal capillary refill noted  Neuro: Speech is normal and fluent. Face is symmetric. Moving all extremities.   Psych:  Normal affect.  Appropriate interactions.    Emergency Department Course     Laboratory:  Results for orders placed or performed during the hospital encounter of 05/06/21   Basic metabolic panel     Status: Abnormal   Result Value Ref Range    Sodium 136 133 - 144 mmol/L    Potassium 3.0 (L) 3.4 - 5.3 mmol/L    Chloride 106 94 - 109 mmol/L    Carbon Dioxide 20 20 - 32 mmol/L    Anion Gap 10 3 - 14 mmol/L    Glucose 119 (H) 70 - 99 mg/dL    Urea Nitrogen 7 7 - 30 mg/dL    Creatinine 0.62 0.52 - 1.04 mg/dL    GFR Estimate >90 >60 mL/min/[1.73_m2]    GFR Estimate If Black >90 >60 mL/min/[1.73_m2]    Calcium 9.1 8.5 - 10.1 mg/dL   Hepatic panel     Status: None   Result Value Ref Range    Bilirubin Direct 0.2 0.0 - 0.2 mg/dL     Bilirubin Total 0.9 0.2 - 1.3 mg/dL    Albumin 4.1 3.4 - 5.0 g/dL    Protein Total 8.1 6.8 - 8.8 g/dL    Alkaline Phosphatase 61 40 - 150 U/L    ALT 25 0 - 50 U/L    AST 11 0 - 45 U/L   CBC with platelets     Status: Abnormal   Result Value Ref Range    WBC 12.3 (H) 4.0 - 11.0 10e9/L    RBC Count 5.02 3.8 - 5.2 10e12/L    Hemoglobin 15.6 11.7 - 15.7 g/dL    Hematocrit 44.8 35.0 - 47.0 %    MCV 89 78 - 100 fl    MCH 31.1 26.5 - 33.0 pg    MCHC 34.8 31.5 - 36.5 g/dL    RDW 12.6 10.0 - 15.0 %    Platelet Count 282 150 - 450 10e9/L   Lipase     Status: None   Result Value Ref Range    Lipase 100 73 - 393 U/L   Magnesium     Status: None   Result Value Ref Range    Magnesium 2.0 1.6 - 2.3 mg/dL      Emergency Department Course:    Reviewed:  I reviewed nursing notes, vitals and past medical history    Assessments:  1210 I obtained history and examined the patient as noted above.   1259 I rechecked the patient and explained findings. No active vomiting.   1418 I rechecked the patient. She is feeling improved and has tolerated Pedialyte and a popsicle. She does not require further prescriptions.     Interventions:  Medications   potassium chloride 10 mEq in 100 mL sterile water intermittent infusion (premix) (10 mEq Intravenous New Bag 5/6/21 1331)   ondansetron (ZOFRAN) injection 4 mg (4 mg Intravenous Given 5/6/21 1045)   0.9% sodium chloride BOLUS (1,000 mLs Intravenous New Bag 5/6/21 1045)   metoclopramide (REGLAN) injection 10 mg (10 mg Intravenous Given 5/6/21 1205)   pantoprazole (PROTONIX) IV push injection 40 mg (40 mg Intravenous Given 5/6/21 1205)   diphenhydrAMINE (BENADRYL) injection 25 mg (25 mg Intravenous Given 5/6/21 1205)      Disposition:  The patient was discharged to home.       Impression & Plan     CMS Diagnoses: None    Medical Decision Making:  Layne is a 35-year-old woman who is a G3, P2 currently 6 weeks and 6 days pregnant who suffered from hyperemesis gravidarum in the past with her  pregnancies who presents here today with ongoing nausea and vomiting.  She was seen here in the emergency department yesterday and underwent a pelvic ultrasound which was normal as well as blood work which showed a mild hypokalemia.  UA at that time shows ketones but no evidence of infection.  She returns here today due to ongoing vomiting.  My suspicion is that this is her ongoing hyperemesis.  She has Reglan, Phenergan suppositories and ODT Zofran prescribed at home but has been unable to tolerate p.o. and was worried about dehydration thus to return to the ER.  Upon initial evaluation she is mildly tachycardic but otherwise hemodynamically stable.  Her abdomen is benign without any significant guarding or rebound.  I do not believe she requires any additional advanced imaging here today.  Patient was treated with Zofran, Reglan, Protonix, Benadryl and IV fluids with significant improvement of her symptoms.  At the time of discharge she was able to tolerate a popsicle and Pedialyte and felt significantly better.  We discussed her ongoing regime for her nausea and vomiting and she feels comfortable with the prescribed medications.  She has required PICC lines before in the past with her pregnancies.  I do not believe we are at that point currently but this is certainly something we would consider in the future.  I discussed follow-up closely with her OB/GYN and return to the emergency department with any new or worsening symptoms.    Diagnosis:    ICD-10-CM    1. Hyperemesis gravidarum  O21.0        Scribe Disclosure:  I, Angelic Morrell, am serving as a scribe at 11:44 AM on 5/6/2021 to document services personally performed by Austyn Ayers MD based on my observations and the provider's statements to me.          Austyn Ayers MD  05/06/21 2133

## 2021-05-06 NOTE — TELEPHONE ENCOUNTER
"I recommend the suppository ASAP as this is the most likely to help when she cannot take anything else. I\"ll send Prasadlan.    Gale Beck MD    "

## 2021-05-06 NOTE — ED NOTES
Vaginal Bleeding - Vaginal Bleeding: not present (PT COMES IN AND IS PREG. WITH SEVERE EMESIS. BABY # 3 WORSE THE LAST 2-3 DAYS. PT 6 WEEKS PREG. SEEN HERE YESTERDAY FOR SAME SYMPTOMS. )  Fetal Assessment - Fetal Movement: Absent   Additional Rows - Fetal Movement: Absent

## 2021-05-06 NOTE — ED NOTES
PT TOLERATING PO CHALLENGE WELL. PLACED BACITRACIN AND NON-ADHERANT DRESSING ON RIGHT AC ON RASH FROM WHERE TAPE WAS REMOVED YESTERDAY

## 2021-05-12 ENCOUNTER — ALLIED HEALTH/NURSE VISIT (OUTPATIENT)
Dept: NURSING | Facility: CLINIC | Age: 36
End: 2021-05-12
Payer: COMMERCIAL

## 2021-05-12 DIAGNOSIS — Z34.80 PRENATAL CARE, SUBSEQUENT PREGNANCY, UNSPECIFIED TRIMESTER: Primary | ICD-10-CM

## 2021-05-12 PROCEDURE — 99207 PR NO CHARGE NURSE ONLY: CPT

## 2021-05-12 RX ORDER — FOLIC ACID 1 MG/1
1 TABLET ORAL
COMMUNITY
Start: 2020-11-05 | End: 2021-09-30

## 2021-05-12 SDOH — ECONOMIC STABILITY: TRANSPORTATION INSECURITY
IN THE PAST 12 MONTHS, HAS THE LACK OF TRANSPORTATION KEPT YOU FROM MEDICAL APPOINTMENTS OR FROM GETTING MEDICATIONS?: NOT ASKED

## 2021-05-12 SDOH — ECONOMIC STABILITY: FOOD INSECURITY: WITHIN THE PAST 12 MONTHS, YOU WORRIED THAT YOUR FOOD WOULD RUN OUT BEFORE YOU GOT MONEY TO BUY MORE.: NOT ASKED

## 2021-05-12 SDOH — ECONOMIC STABILITY: FOOD INSECURITY: WITHIN THE PAST 12 MONTHS, THE FOOD YOU BOUGHT JUST DIDN'T LAST AND YOU DIDN'T HAVE MONEY TO GET MORE.: NOT ASKED

## 2021-05-12 SDOH — ECONOMIC STABILITY: INCOME INSECURITY: HOW HARD IS IT FOR YOU TO PAY FOR THE VERY BASICS LIKE FOOD, HOUSING, MEDICAL CARE, AND HEATING?: NOT ASKED

## 2021-05-12 SDOH — ECONOMIC STABILITY: TRANSPORTATION INSECURITY
IN THE PAST 12 MONTHS, HAS LACK OF TRANSPORTATION KEPT YOU FROM MEETINGS, WORK, OR FROM GETTING THINGS NEEDED FOR DAILY LIVING?: NOT ASKED

## 2021-05-12 NOTE — PROGRESS NOTES
NPN nurse visit done over the phone. Pt will be given NPN folder and book at her upcoming appt.   Discussed optional screening available to assess chromosomal anomalies. Questions answered. Pt advised to call the clinic if she has any questions or concerns related to her pregnancy. Prenatal labs will be obtained at her upcoming appt. New prenatal visit scheduled on 6/10/21 with Dr Beck.    7w5d    No results found for: PAP        Patient supplied answers from flow sheet for:  Prenatal OB Questionnaire.  Past Medical History  Have you ever recieved care for your mental health? : No  Have you ever been in a major accident or suffered serious trauma?: No  Within the last year, has anyone hit, slapped, kicked or otherwise hurt you?: No  In the last year, has anyone forced you to have sex when you didn't want to?: No    Past Medical History 2   Have you ever received a blood transfusion?: No  Would you accept a blood transfusion if was medically recommended?: Yes  Does anyone in your home smoke?: No   Is your blood type Rh negative?: No  Have you ever ?: (!) Yes  Have you been hospitalized for a nonsurgical reason excluding normal delivery?: No  Have you ever had an abnormal pap smear?: No    Past Medical History (Continued)  Do you have a history of abnormalities of the uterus?: No  Did your mother take GABBI or any other hormones when she was pregnant with you?: No  Do you have any other problems we have not asked about which you feel may be important to this pregnancy?: No

## 2021-06-01 ENCOUNTER — RECORDS - HEALTHEAST (OUTPATIENT)
Dept: ADMINISTRATIVE | Facility: CLINIC | Age: 36
End: 2021-06-01

## 2021-06-10 ENCOUNTER — PRENATAL OFFICE VISIT (OUTPATIENT)
Dept: OBGYN | Facility: CLINIC | Age: 36
End: 2021-06-10
Payer: COMMERCIAL

## 2021-06-10 VITALS
WEIGHT: 251 LBS | SYSTOLIC BLOOD PRESSURE: 132 MMHG | BODY MASS INDEX: 46.19 KG/M2 | DIASTOLIC BLOOD PRESSURE: 90 MMHG | HEART RATE: 96 BPM | HEIGHT: 62 IN

## 2021-06-10 DIAGNOSIS — Z12.4 SCREENING FOR CERVICAL CANCER: ICD-10-CM

## 2021-06-10 DIAGNOSIS — Z11.3 SCREENING EXAMINATION FOR SEXUALLY TRANSMITTED DISEASE: ICD-10-CM

## 2021-06-10 DIAGNOSIS — Z34.80 PRENATAL CARE, SUBSEQUENT PREGNANCY, UNSPECIFIED TRIMESTER: ICD-10-CM

## 2021-06-10 DIAGNOSIS — O10.919 CHRONIC HYPERTENSION IN PREGNANCY: Primary | ICD-10-CM

## 2021-06-10 LAB
ABO + RH BLD: NORMAL
ABO + RH BLD: NORMAL
BLD GP AB SCN SERPL QL: NORMAL
BLOOD BANK CMNT PATIENT-IMP: NORMAL
CREAT UR-MCNC: 241 MG/DL
HBV SURFACE AG SERPL QL IA: NONREACTIVE
HCV AB SERPL QL IA: NONREACTIVE
HIV 1+2 AB+HIV1 P24 AG SERPL QL IA: NONREACTIVE
PROT UR-MCNC: 0.13 G/L
PROT/CREAT 24H UR: 0.06 G/G CR (ref 0–0.2)
SPECIMEN EXP DATE BLD: NORMAL
T PALLIDUM AB SER QL: NONREACTIVE

## 2021-06-10 PROCEDURE — 86762 RUBELLA ANTIBODY: CPT | Performed by: OBSTETRICS & GYNECOLOGY

## 2021-06-10 PROCEDURE — 86850 RBC ANTIBODY SCREEN: CPT | Performed by: OBSTETRICS & GYNECOLOGY

## 2021-06-10 PROCEDURE — 86901 BLOOD TYPING SEROLOGIC RH(D): CPT | Performed by: OBSTETRICS & GYNECOLOGY

## 2021-06-10 PROCEDURE — 87491 CHLMYD TRACH DNA AMP PROBE: CPT | Performed by: OBSTETRICS & GYNECOLOGY

## 2021-06-10 PROCEDURE — 84156 ASSAY OF PROTEIN URINE: CPT | Performed by: OBSTETRICS & GYNECOLOGY

## 2021-06-10 PROCEDURE — 36415 COLL VENOUS BLD VENIPUNCTURE: CPT | Performed by: OBSTETRICS & GYNECOLOGY

## 2021-06-10 PROCEDURE — 86780 TREPONEMA PALLIDUM: CPT | Mod: 90 | Performed by: OBSTETRICS & GYNECOLOGY

## 2021-06-10 PROCEDURE — 87624 HPV HI-RISK TYP POOLED RSLT: CPT | Performed by: OBSTETRICS & GYNECOLOGY

## 2021-06-10 PROCEDURE — 87389 HIV-1 AG W/HIV-1&-2 AB AG IA: CPT | Performed by: OBSTETRICS & GYNECOLOGY

## 2021-06-10 PROCEDURE — G0145 SCR C/V CYTO,THINLAYER,RESCR: HCPCS | Performed by: OBSTETRICS & GYNECOLOGY

## 2021-06-10 PROCEDURE — 87591 N.GONORRHOEAE DNA AMP PROB: CPT | Performed by: OBSTETRICS & GYNECOLOGY

## 2021-06-10 PROCEDURE — 87340 HEPATITIS B SURFACE AG IA: CPT | Performed by: OBSTETRICS & GYNECOLOGY

## 2021-06-10 PROCEDURE — 86900 BLOOD TYPING SEROLOGIC ABO: CPT | Performed by: OBSTETRICS & GYNECOLOGY

## 2021-06-10 PROCEDURE — 86803 HEPATITIS C AB TEST: CPT | Performed by: OBSTETRICS & GYNECOLOGY

## 2021-06-10 PROCEDURE — 99207 PR FIRST OB VISIT: CPT | Performed by: OBSTETRICS & GYNECOLOGY

## 2021-06-10 PROCEDURE — 99000 SPECIMEN HANDLING OFFICE-LAB: CPT | Performed by: OBSTETRICS & GYNECOLOGY

## 2021-06-10 ASSESSMENT — MIFFLIN-ST. JEOR: SCORE: 1791.78

## 2021-06-10 NOTE — NURSING NOTE
"Chief Complaint   Patient presents with     Prenatal Care       Initial BP (!) 132/90   Pulse 96   Ht 1.575 m (5' 2\")   Wt 113.9 kg (251 lb)   LMP 2021   Breastfeeding No   BMI 45.91 kg/m   Estimated body mass index is 45.91 kg/m  as calculated from the following:    Height as of this encounter: 1.575 m (5' 2\").    Weight as of this encounter: 113.9 kg (251 lb).  BP completed using cuff size: large    Questioned patient about current smoking habits.  Pt. has never smoked.          The following HM Due: pap smear and GC      The following patient reported/Care Every where data was sent to:  P ABSTRACT QUALITY INITIATIVES [[id number]]  Nataliya Wen LPN             "

## 2021-06-10 NOTE — PROGRESS NOTES
"This is a 36 year old female patient,   who presents for her first obstetrical visit. This pregnancy is Planned, Desired.    EDC Dec 24, 2021 by Previous US which makes her 11w6d  today.  Her cycles are irregular.  Since her LMP, she has experienced  nausea and emesis.  She denies vaginal bleeding. Ultrasound in the 1st trimester showed EDC consistent with 2021.    OB History    Para Term  AB Living   3 2 2 0 0 2   SAB TAB Ectopic Multiple Live Births   0 0 0 0 2      # Outcome Date GA Lbr Raymond/2nd Weight Sex Delivery Anes PTL Lv   3 Current            2 Term 05/31/15 41w1d 01:46 / 00:01 3.561 kg (7 lb 13.6 oz) M Vag-Spont None N DEMARIO      Apgar1: 8  Apgar5: 9   1 Term 13 40w5d 04:10 / 02:24 3.725 kg (8 lb 3.4 oz) M Vag-Spont EPI  DEMARIO      Birth Comments: none      Name: ALFREDO SERNA      Apgar1: 8  Apgar5: 9       History of GDM: No,  PTL : No,  History of HTN in pregnancy: No,  Thrombocytopenia: No,  Shoulder dystocia: No,  Vacuum Extraction: No  PPH: No   3rd of 4th degree laceration: No.   Other complications: Yes - \"white coat hypertension\" without diagnosis of chronic essential hypertension. Blood pressure elevated today and in the ED, as well as previously in her chart.      Since her last LMP she denies use of alcohol, tobacco and street drugs.    HISTORY:  Past Medical History:   Diagnosis Date     Anxiety      Depression      Depressive disorder      Hx of previous reproductive problem      Migraines      Obesity      PCOS (polycystic ovarian syndrome)      Postpartum depression      Rh incompatibility      Thyroid disease     parathyroid removed     Past Surgical History:   Procedure Laterality Date     C REMOVAL OF KIDNEY STONE  2010     PARATHYROIDECTOMY  2010     Family History   Problem Relation Age of Onset     Cancer Paternal Grandfather      Social History     Socioeconomic History     Marital status:      Spouse name: None     Number of children: " None     Years of education: None     Highest education level: None   Occupational History     Occupation: not working     Comment: 5/12/21   Social Needs     Financial resource strain: None     Food insecurity     Worry: None     Inability: None     Transportation needs     Medical: None     Non-medical: None   Tobacco Use     Smoking status: Never Smoker     Smokeless tobacco: Never Used   Substance and Sexual Activity     Alcohol use: Not Currently     Comment: occ     Drug use: No     Sexual activity: Yes     Partners: Male   Lifestyle     Physical activity     Days per week: None     Minutes per session: None     Stress: None   Relationships     Social connections     Talks on phone: None     Gets together: None     Attends Baptism service: None     Active member of club or organization: None     Attends meetings of clubs or organizations: None     Relationship status: None     Intimate partner violence     Fear of current or ex partner: None     Emotionally abused: None     Physically abused: None     Forced sexual activity: None   Other Topics Concern     None   Social History Narrative     None     Current Outpatient Medications   Medication Sig     metoclopramide (REGLAN) 5 MG tablet TAKE 1 TO 2 TABLETS(5 TO 10 MG) BY MOUTH FOUR TIMES DAILY     folic acid (FOLVITE) 1 MG tablet Take 1 mg by mouth     ondansetron (ZOFRAN-ODT) 4 MG ODT tab Take 1 tablet (4 mg) by mouth every 6 hours as needed for nausea (Patient not taking: Reported on 6/10/2021)     promethazine (PHENERGAN) 25 MG suppository Place 1 suppository (25 mg) rectally every 6 hours as needed for nausea (Patient not taking: Reported on 6/10/2021)     VITAMIN D, CHOLECALCIFEROL, PO Take by mouth daily     No current facility-administered medications for this visit.      Allergies   Allergen Reactions     Adhesive Tape Rash       Past medical, surgical, social and family history were reviewed and updated in EPIC.    ROS:   12 point review of systems  "negative other than symptoms noted below.    EXAM:  BP (!) 132/90   Pulse 96   Ht 1.575 m (5' 2\")   Wt 113.9 kg (251 lb)   LMP 02/20/2021   Breastfeeding No   BMI 45.91 kg/m     BMI: Body mass index is 45.91 kg/m .    EXAM:  Constitutional: Appearance: Well nourished, well developed alert, in no acute distress  Chest:  Respiratory Effort:  Breathing unlabored  Cardiovascular:Heart    Auscultation:  Regular rate, normal rhythm, no murmurs present  Gastrointestinal:  Abdominal Examination:  Abdomen nontender to palpation, tone normal without     rigidity or guarding, no masses present, umbilicus without lesions    Liver and speen:  No hepatomegaly present, liver nontender to palpation    Hernias:  No hernias present  Skin:  General Inspection:  No rashes present, no lesions present, no areas of  discoloration.  Neurologic/Psychiatric:    Mental Status:  Oriented X3       Pelvis: External genitalia, Bartholin, urethral, and Chisholm glands within normal limits. Urethra is without lesion and nontender to palpation. Bladder is nontender. On speculum exam, cervix is without lesion and vagina is normal without lesion or discharge. Pap smear obtained without incident.    ASSESSMENT:      ICD-10-CM    1. Chronic hypertension in pregnancy  O10.919 Protein  random urine with Creat Ratio     Creatinine urine calculation only   2. Prenatal care, subsequent pregnancy, unspecified trimester  Z34.80 Treponema Abs w Reflex to RPR and Titer     Rubella Antibody IgG Quantitative     HIV Antigen Antibody Combo     Hepatitis B surface antigen     ABO/Rh type and screen     Hepatitis C antibody   3. Screening for cervical cancer  Z12.4 HPV High Risk Types DNA Cervical     Pap imaged thin layer screen with HPV - recommended age 30 - 65 years (select HPV order below)   4. Screening examination for sexually transmitted disease  Z11.3 NEISSERIA GONORRHOEA PCR     CHLAMYDIA TRACHOMATIS PCR       PLAN:    Prenatal labs reviewed. She has no " questions.    Discussed options for screening for and diagnosis of chromosomal anomalies, including first trimester screen, noninvasive prenatal testing/cell-free fetal DNA testing, CVS/amniocentesis, quad screen, and ultrasound or comprehensive Level II US at 18-20 weeks. She is electing noninvasive prenatal testing and level II ultrasound at 18-20 weeks (level II for BMI 45 and chronic hypertension).    This patient is not currently on medication for blood pressure control. We discussed that if she develops a need for an antihypertensive, labetalol or nifedipine would be recommended. We discussed that she is at increased risk for development of severe range blood pressures, superimposed preeclampsia, headaches, visual changes, liver complications, HELLP syndrome, and intrauterine growth restriction. Recommend the following baseline evaluation:    - Protein/Creatinine Ratio  - Creatinine, AST, ALT, cbc if not already done.    Plan:    - Anatomy scan at 18-20 weeks (level II) followed by growth US every 4 weeks  - Consideration of weekly  testing starting at 32 weeks.  - Delivery at or after 38 weeks, unless indicated sooner based on her clinical picture.  - Start a baby aspirin daily after 12 weeks to decrease risk for preeclampsia.      Reviewed early pregnancy education, diet, exercise, prenatal vitamins, intercourse. Reviewed the call schedule, labor and delivery, and the schedule of prenatal visits.    Follow up in 4 weeks. She is encouraged to call sooner with questions or concerns.      Gale Beck MD

## 2021-06-11 LAB
C TRACH DNA SPEC QL NAA+PROBE: NEGATIVE
N GONORRHOEA DNA SPEC QL NAA+PROBE: NEGATIVE
RUBV IGG SERPL IA-ACNC: 57 IU/ML
SPECIMEN SOURCE: NORMAL
SPECIMEN SOURCE: NORMAL

## 2021-06-15 LAB
COPATH REPORT: NORMAL
PAP: NORMAL

## 2021-06-17 LAB
FINAL DIAGNOSIS: NORMAL
HPV HR 12 DNA CVX QL NAA+PROBE: NEGATIVE
HPV16 DNA SPEC QL NAA+PROBE: NEGATIVE
HPV18 DNA SPEC QL NAA+PROBE: NEGATIVE
SPECIMEN DESCRIPTION: NORMAL
SPECIMEN SOURCE CVX/VAG CYTO: NORMAL

## 2021-06-20 DIAGNOSIS — O21.0 HYPEREMESIS GRAVIDARUM: ICD-10-CM

## 2021-06-21 RX ORDER — METOCLOPRAMIDE 5 MG/1
TABLET ORAL
Qty: 30 TABLET | Refills: 1 | Status: SHIPPED | OUTPATIENT
Start: 2021-06-21 | End: 2021-07-05

## 2021-07-13 ENCOUNTER — PRENATAL OFFICE VISIT (OUTPATIENT)
Dept: OBGYN | Facility: CLINIC | Age: 36
End: 2021-07-13
Payer: COMMERCIAL

## 2021-07-13 VITALS — BODY MASS INDEX: 45.36 KG/M2 | DIASTOLIC BLOOD PRESSURE: 70 MMHG | SYSTOLIC BLOOD PRESSURE: 114 MMHG | WEIGHT: 248 LBS

## 2021-07-13 DIAGNOSIS — O21.0 HYPEREMESIS GRAVIDARUM: ICD-10-CM

## 2021-07-13 DIAGNOSIS — Z34.80 PRENATAL CARE, SUBSEQUENT PREGNANCY, UNSPECIFIED TRIMESTER: Primary | ICD-10-CM

## 2021-07-13 PROCEDURE — 99207 PR PRENATAL VISIT: CPT | Performed by: OBSTETRICS & GYNECOLOGY

## 2021-07-13 PROCEDURE — 36415 COLL VENOUS BLD VENIPUNCTURE: CPT | Performed by: OBSTETRICS & GYNECOLOGY

## 2021-07-13 RX ORDER — METOCLOPRAMIDE 5 MG/1
TABLET ORAL
Qty: 180 TABLET | Refills: 1 | Status: SHIPPED | OUTPATIENT
Start: 2021-07-13 | End: 2021-12-23

## 2021-07-13 NOTE — PROGRESS NOTES
PROBLEM LIST  LABS: Oneg/RI    1. Chronic hypertension: on baby ASA. Baseline labs normal. Level II US. Weekly testing at 32 weeks.   2. BMI 45: level II US. Weekly testing as above. Consider delivery at 39-40w.    Reglan helps with nausea, so this was renewed. I see that her US is scheduled here and needs to be in [facility] (after her appointment was over), so will get this switched over to [facility] level II US. Wants to do [facility] today, so discussed again and consent signed. AFP at next visit or at [facility] if that occurs first.      Gale Beck MD

## 2021-07-13 NOTE — NURSING NOTE
"Chief Complaint   Patient presents with     Prenatal Care       Initial /70   Wt 112.5 kg (248 lb)   LMP 2021   Breastfeeding No   BMI 45.36 kg/m   Estimated body mass index is 45.36 kg/m  as calculated from the following:    Height as of 6/10/21: 1.575 m (5' 2\").    Weight as of this encounter: 112.5 kg (248 lb).  BP completed using cuff size: large    Questioned patient about current smoking habits.  Pt. has never smoked.          16w4d  + FM noted  + mild cramping  - bleeding  + nausea in AM's  Nataliya Wen LPN               "

## 2021-07-14 ENCOUNTER — TELEPHONE (OUTPATIENT)
Dept: OBGYN | Facility: CLINIC | Age: 36
End: 2021-07-14

## 2021-07-14 ENCOUNTER — TRANSCRIBE ORDERS (OUTPATIENT)
Dept: MATERNAL FETAL MEDICINE | Facility: CLINIC | Age: 36
End: 2021-07-14

## 2021-07-14 DIAGNOSIS — Z34.80 PRENATAL CARE, SUBSEQUENT PREGNANCY, UNSPECIFIED TRIMESTER: Primary | ICD-10-CM

## 2021-07-14 DIAGNOSIS — O10.919 CHRONIC HYPERTENSION IN PREGNANCY: ICD-10-CM

## 2021-07-14 DIAGNOSIS — O26.90 PREGNANCY RELATED CONDITION, ANTEPARTUM: Primary | ICD-10-CM

## 2021-07-14 NOTE — TELEPHONE ENCOUNTER
----- Message from Gale Beck MD sent at 7/13/2021  5:41 PM CDT -----  She is scheduled for an US here but needs level II--not sure how this got scheduled for her. Please get her in for level II for chronic hypertension and BMI 45. Thanks.    Gale Beck MD

## 2021-07-21 LAB
Lab: NORMAL
PERFORMING LABORATORY: NORMAL
SCANNED LAB RESULT: NORMAL
SPECIMEN STATUS: NORMAL
TEST NAME: NORMAL

## 2021-07-24 VITALS
HEART RATE: 80 BPM | DIASTOLIC BLOOD PRESSURE: 100 MMHG | OXYGEN SATURATION: 99 % | RESPIRATION RATE: 20 BRPM | TEMPERATURE: 98.3 F | SYSTOLIC BLOOD PRESSURE: 141 MMHG

## 2021-07-25 ENCOUNTER — HOSPITAL ENCOUNTER (EMERGENCY)
Facility: CLINIC | Age: 36
Discharge: LEFT BEFORE DISCHARGE | End: 2021-07-25
Payer: COMMERCIAL

## 2021-07-25 NOTE — ED TRIAGE NOTES
Pt states RLQ abdominal pain tonight. Pt states feels similar to previous kidney stone. Pt states approx 18 weeks gestation, denies vaginal bleeding or pelvic pain. ABCs intact GCS 15

## 2021-07-28 ENCOUNTER — PRE VISIT (OUTPATIENT)
Dept: MATERNAL FETAL MEDICINE | Facility: CLINIC | Age: 36
End: 2021-07-28

## 2021-08-04 ENCOUNTER — HOSPITAL ENCOUNTER (OUTPATIENT)
Dept: ULTRASOUND IMAGING | Facility: CLINIC | Age: 36
End: 2021-08-04
Attending: OBSTETRICS & GYNECOLOGY
Payer: COMMERCIAL

## 2021-08-04 ENCOUNTER — OFFICE VISIT (OUTPATIENT)
Dept: MATERNAL FETAL MEDICINE | Facility: CLINIC | Age: 36
End: 2021-08-04
Attending: OBSTETRICS & GYNECOLOGY
Payer: COMMERCIAL

## 2021-08-04 DIAGNOSIS — E66.01 CLASS 3 SEVERE OBESITY WITH SERIOUS COMORBIDITY AND BODY MASS INDEX (BMI) OF 45.0 TO 49.9 IN ADULT, UNSPECIFIED OBESITY TYPE (H): ICD-10-CM

## 2021-08-04 DIAGNOSIS — E66.813 CLASS 3 SEVERE OBESITY WITH SERIOUS COMORBIDITY AND BODY MASS INDEX (BMI) OF 45.0 TO 49.9 IN ADULT, UNSPECIFIED OBESITY TYPE (H): ICD-10-CM

## 2021-08-04 DIAGNOSIS — O16.2 HYPERTENSION AFFECTING PREGNANCY IN SECOND TRIMESTER: Primary | ICD-10-CM

## 2021-08-04 DIAGNOSIS — O26.90 PREGNANCY RELATED CONDITION, ANTEPARTUM: ICD-10-CM

## 2021-08-04 PROCEDURE — 76811 OB US DETAILED SNGL FETUS: CPT

## 2021-08-04 PROCEDURE — 76811 OB US DETAILED SNGL FETUS: CPT | Mod: 26 | Performed by: OBSTETRICS & GYNECOLOGY

## 2021-08-04 NOTE — PROGRESS NOTES
Please see full imaging report from [employer] program under imaging tab.    Nelson Grimm MD  Maternal Fetal Medicine

## 2021-08-05 ENCOUNTER — PRENATAL OFFICE VISIT (OUTPATIENT)
Dept: OBGYN | Facility: CLINIC | Age: 36
End: 2021-08-05
Payer: COMMERCIAL

## 2021-08-05 VITALS — DIASTOLIC BLOOD PRESSURE: 82 MMHG | BODY MASS INDEX: 46.35 KG/M2 | WEIGHT: 253.4 LBS | SYSTOLIC BLOOD PRESSURE: 132 MMHG

## 2021-08-05 DIAGNOSIS — Z34.80 PRENATAL CARE, SUBSEQUENT PREGNANCY, UNSPECIFIED TRIMESTER: Primary | ICD-10-CM

## 2021-08-05 DIAGNOSIS — O10.919 CHRONIC HYPERTENSION IN PREGNANCY: ICD-10-CM

## 2021-08-05 PROCEDURE — 99207 PR PRENATAL VISIT: CPT | Performed by: OBSTETRICS & GYNECOLOGY

## 2021-08-05 NOTE — NURSING NOTE
"Chief Complaint   Patient presents with     Prenatal Care     19 weeks 6 days   MFM yesterday        Initial /82 (BP Location: Left arm, Cuff Size: Adult Regular)   Wt 114.9 kg (253 lb 6.4 oz)   LMP 2021   Breastfeeding No   BMI 46.35 kg/m   Estimated body mass index is 46.35 kg/m  as calculated from the following:    Height as of 6/10/21: 1.575 m (5' 2\").    Weight as of this encounter: 114.9 kg (253 lb 6.4 oz).  BP completed using cuff size: large    Questioned patient about current smoking habits.  Pt. has never smoked.    19w6d      The following HM Due: NONE        +FM daily   +MFM Yesterday   + Lower right pelvic /abdominal pain         Arleth Adams, CMA on 2021 at 1:57 PM           "

## 2021-08-05 NOTE — PROGRESS NOTES
PROBLEM LIST  LABS: Oneg/RI GIRL    1. Chronic hypertension: on baby ASA. Baseline labs normal. Level II US within normal limits. Weekly testing at 32 weeks. Deliver at 38w.  2. BMI 45: level II US. Weekly testing as above. Delivery at 38 weeks for cHTN as above.    Doing well. US reviewed. Growth scan 9/15. Follow up with me in 1 month.    Gale Beck MD

## 2021-09-02 ENCOUNTER — PRENATAL OFFICE VISIT (OUTPATIENT)
Dept: OBGYN | Facility: CLINIC | Age: 36
End: 2021-09-02
Payer: COMMERCIAL

## 2021-09-02 VITALS — BODY MASS INDEX: 46.64 KG/M2 | WEIGHT: 255 LBS | DIASTOLIC BLOOD PRESSURE: 70 MMHG | SYSTOLIC BLOOD PRESSURE: 126 MMHG

## 2021-09-02 DIAGNOSIS — O10.919 CHRONIC HYPERTENSION IN PREGNANCY: Primary | ICD-10-CM

## 2021-09-02 PROCEDURE — 99207 PR PRENATAL VISIT: CPT | Performed by: OBSTETRICS & GYNECOLOGY

## 2021-09-02 NOTE — NURSING NOTE
"Chief Complaint   Patient presents with     Prenatal Care       Initial /70   Wt 115.7 kg (255 lb)   LMP 2021   Breastfeeding No   BMI 46.64 kg/m   Estimated body mass index is 46.64 kg/m  as calculated from the following:    Height as of 6/10/21: 1.575 m (5' 2\").    Weight as of this encounter: 115.7 kg (255 lb).  BP completed using cuff size: large    Questioned patient about current smoking habits.  Pt. has never smoked.          23w6d  + FM noted  + nausea   - cramping or bleeding  - headache or heartburn  Nataliya Wen LPN               "

## 2021-09-15 ENCOUNTER — OFFICE VISIT (OUTPATIENT)
Dept: MATERNAL FETAL MEDICINE | Facility: CLINIC | Age: 36
End: 2021-09-15
Attending: OBSTETRICS & GYNECOLOGY
Payer: COMMERCIAL

## 2021-09-15 ENCOUNTER — HOSPITAL ENCOUNTER (OUTPATIENT)
Dept: ULTRASOUND IMAGING | Facility: CLINIC | Age: 36
End: 2021-09-15
Attending: OBSTETRICS & GYNECOLOGY
Payer: COMMERCIAL

## 2021-09-15 DIAGNOSIS — E66.813 CLASS 3 SEVERE OBESITY WITH SERIOUS COMORBIDITY AND BODY MASS INDEX (BMI) OF 45.0 TO 49.9 IN ADULT, UNSPECIFIED OBESITY TYPE (H): ICD-10-CM

## 2021-09-15 DIAGNOSIS — E66.01 CLASS 3 SEVERE OBESITY WITH SERIOUS COMORBIDITY AND BODY MASS INDEX (BMI) OF 45.0 TO 49.9 IN ADULT, UNSPECIFIED OBESITY TYPE (H): ICD-10-CM

## 2021-09-15 DIAGNOSIS — O16.2 HYPERTENSION AFFECTING PREGNANCY IN SECOND TRIMESTER: Primary | ICD-10-CM

## 2021-09-15 DIAGNOSIS — O16.2 HYPERTENSION AFFECTING PREGNANCY IN SECOND TRIMESTER: ICD-10-CM

## 2021-09-15 PROCEDURE — 76816 OB US FOLLOW-UP PER FETUS: CPT

## 2021-09-15 PROCEDURE — 76816 OB US FOLLOW-UP PER FETUS: CPT | Mod: 26 | Performed by: OBSTETRICS & GYNECOLOGY

## 2021-09-15 NOTE — PROGRESS NOTES
Layne Blas was seen for an ultrasound today at the [facility] Medicine center.      For the details of the ultrasound please see the report which can be found under the imaging tab.      Ivy Ford MD  , OB/GYN  Maternal-Fetal Medicine  [email or web address]  [phone] (Main M Office)  [phone] or [phone] (for 24 hour MFM questions)  [phone] (Pager)

## 2021-09-30 ENCOUNTER — PRENATAL OFFICE VISIT (OUTPATIENT)
Dept: OBGYN | Facility: CLINIC | Age: 36
End: 2021-09-30
Payer: COMMERCIAL

## 2021-09-30 VITALS — WEIGHT: 257.1 LBS | DIASTOLIC BLOOD PRESSURE: 74 MMHG | SYSTOLIC BLOOD PRESSURE: 118 MMHG | BODY MASS INDEX: 47.02 KG/M2

## 2021-09-30 DIAGNOSIS — Z34.80 PRENATAL CARE, SUBSEQUENT PREGNANCY, UNSPECIFIED TRIMESTER: ICD-10-CM

## 2021-09-30 DIAGNOSIS — O10.919 CHRONIC HYPERTENSION IN PREGNANCY: ICD-10-CM

## 2021-09-30 DIAGNOSIS — Z67.91 RH NEGATIVE STATE IN ANTEPARTUM PERIOD, THIRD TRIMESTER: Primary | ICD-10-CM

## 2021-09-30 DIAGNOSIS — O26.893 RH NEGATIVE STATE IN ANTEPARTUM PERIOD, THIRD TRIMESTER: Primary | ICD-10-CM

## 2021-09-30 LAB
ANTIBODY SCREEN: NEGATIVE
ERYTHROCYTE [DISTWIDTH] IN BLOOD BY AUTOMATED COUNT: 12.9 % (ref 10–15)
HCT VFR BLD AUTO: 36.5 % (ref 35–47)
HGB BLD-MCNC: 12.2 G/DL (ref 11.7–15.7)
MCH RBC QN AUTO: 31.4 PG (ref 26.5–33)
MCHC RBC AUTO-ENTMCNC: 33.4 G/DL (ref 31.5–36.5)
MCV RBC AUTO: 94 FL (ref 78–100)
PLATELET # BLD AUTO: 238 10E3/UL (ref 150–450)
RBC # BLD AUTO: 3.89 10E6/UL (ref 3.8–5.2)
SPECIMEN EXPIRATION DATE: NORMAL
WBC # BLD AUTO: 11.2 10E3/UL (ref 4–11)

## 2021-09-30 PROCEDURE — 90715 TDAP VACCINE 7 YRS/> IM: CPT | Performed by: OBSTETRICS & GYNECOLOGY

## 2021-09-30 PROCEDURE — 86780 TREPONEMA PALLIDUM: CPT | Performed by: OBSTETRICS & GYNECOLOGY

## 2021-09-30 PROCEDURE — 96372 THER/PROPH/DIAG INJ SC/IM: CPT | Performed by: OBSTETRICS & GYNECOLOGY

## 2021-09-30 PROCEDURE — 99207 PR PRENATAL VISIT: CPT | Performed by: OBSTETRICS & GYNECOLOGY

## 2021-09-30 PROCEDURE — 86850 RBC ANTIBODY SCREEN: CPT | Performed by: OBSTETRICS & GYNECOLOGY

## 2021-09-30 PROCEDURE — 82951 GLUCOSE TOLERANCE TEST (GTT): CPT | Performed by: OBSTETRICS & GYNECOLOGY

## 2021-09-30 PROCEDURE — 90471 IMMUNIZATION ADMIN: CPT | Performed by: OBSTETRICS & GYNECOLOGY

## 2021-09-30 PROCEDURE — 85027 COMPLETE CBC AUTOMATED: CPT | Performed by: OBSTETRICS & GYNECOLOGY

## 2021-09-30 PROCEDURE — 36415 COLL VENOUS BLD VENIPUNCTURE: CPT | Performed by: OBSTETRICS & GYNECOLOGY

## 2021-09-30 NOTE — PROGRESS NOTES
PROBLEM LIST  LABS: Oneg/RI GIRL    1. Chronic hypertension: on baby ASA. Baseline labs normal. Level II US within normal limits. Weekly testing at 32 weeks. Deliver at 38w.  2. BMI 45: level II US. Weekly testing as above. Delivery at 38 weeks for cHTN as above.    Doing well.  Labs and GCT today.  Tdap: plans to do this.  Flu: thinking about it. Discussed in detail today, recommended vaccination, discussed measures to decrease risk of exposure and also discussed prophylactic and therapeutic antivirals.  COVID: not planning vaccination. Recommended and discussed.  Has peds. Planning to breastfeed but may supplement or pump as this has not gone smoothly with her prior children.      Gale Beck MD

## 2021-09-30 NOTE — NURSING NOTE
"Chief Complaint   Patient presents with     Prenatal Care   27w6d  No concerns   Here with spouse  GCT today    initial /74   Wt 116.6 kg (257 lb 1.6 oz)   LMP 02/20/2021   BMI 47.02 kg/m   Estimated body mass index is 47.02 kg/m  as calculated from the following:    Height as of 6/10/21: 1.575 m (5' 2\").    Weight as of this encounter: 116.6 kg (257 lb 1.6 oz).  BP completed using cuff size large.  Alicia Marti CMA'  "

## 2021-10-01 LAB
GLUCOSE 1H P 50 G GLC PO SERPL-MCNC: 104 MG/DL (ref 70–129)
T PALLIDUM AB SER QL: NONREACTIVE

## 2021-10-14 ENCOUNTER — PRENATAL OFFICE VISIT (OUTPATIENT)
Dept: OBGYN | Facility: CLINIC | Age: 36
End: 2021-10-14
Payer: COMMERCIAL

## 2021-10-14 VITALS — WEIGHT: 259.7 LBS | SYSTOLIC BLOOD PRESSURE: 112 MMHG | DIASTOLIC BLOOD PRESSURE: 70 MMHG | BODY MASS INDEX: 47.5 KG/M2

## 2021-10-14 DIAGNOSIS — Z23 NEED FOR PROPHYLACTIC VACCINATION AND INOCULATION AGAINST INFLUENZA: ICD-10-CM

## 2021-10-14 DIAGNOSIS — O10.919 CHRONIC HYPERTENSION IN PREGNANCY: Primary | ICD-10-CM

## 2021-10-14 PROCEDURE — 90686 IIV4 VACC NO PRSV 0.5 ML IM: CPT | Performed by: OBSTETRICS & GYNECOLOGY

## 2021-10-14 PROCEDURE — 90471 IMMUNIZATION ADMIN: CPT | Performed by: OBSTETRICS & GYNECOLOGY

## 2021-10-14 PROCEDURE — 99207 PR PRENATAL VISIT: CPT | Performed by: OBSTETRICS & GYNECOLOGY

## 2021-10-14 NOTE — PROGRESS NOTES
PROBLEM LIST  LABS: Oneg/RI GIRL    1. Chronic hypertension: on baby ASA. Baseline labs normal. Level II US within normal limits. Weekly testing at 32 weeks. Deliver at 38w.  2. BMI 45: level II US. Weekly testing as above. Delivery at 38 weeks for cHTN as above.    Doing well.  Labs and GCT today.  Tdap: done.  Flu: thinking about it. Discussed in detail today, recommended vaccination, discussed measures to decrease risk of exposure and also discussed prophylactic and therapeutic antivirals.  COVID: not planning vaccination. Recommended and discussed.  Has peds. Planning to breastfeed but may supplement or pump as this has not gone smoothly with her prior children.  Discussed that follow up growth scan in [facility] is needed. Encouraged her to go up to the office and schedule this today after her appointment. She states that her anxiety is making it hard for her to go outside the house, as she is worried about COVID exposure as well. She is not interested in the vaccine. Discussed counseling, offered referral which she will consider. She will think about scheduling follow up US and call if she can.    Gale Beck MD

## 2021-10-26 ENCOUNTER — HOSPITAL ENCOUNTER (OUTPATIENT)
Dept: ULTRASOUND IMAGING | Facility: CLINIC | Age: 36
End: 2021-10-26
Attending: OBSTETRICS & GYNECOLOGY
Payer: COMMERCIAL

## 2021-10-26 ENCOUNTER — OFFICE VISIT (OUTPATIENT)
Dept: MATERNAL FETAL MEDICINE | Facility: CLINIC | Age: 36
End: 2021-10-26
Attending: OBSTETRICS & GYNECOLOGY
Payer: COMMERCIAL

## 2021-10-26 DIAGNOSIS — O99.213 OBESITY AFFECTING PREGNANCY IN THIRD TRIMESTER: ICD-10-CM

## 2021-10-26 DIAGNOSIS — E66.01 CLASS 3 SEVERE OBESITY WITH SERIOUS COMORBIDITY AND BODY MASS INDEX (BMI) OF 45.0 TO 49.9 IN ADULT, UNSPECIFIED OBESITY TYPE (H): ICD-10-CM

## 2021-10-26 DIAGNOSIS — O10.919 CHRONIC HYPERTENSION AFFECTING PREGNANCY: Primary | ICD-10-CM

## 2021-10-26 DIAGNOSIS — E66.813 CLASS 3 SEVERE OBESITY WITH SERIOUS COMORBIDITY AND BODY MASS INDEX (BMI) OF 45.0 TO 49.9 IN ADULT, UNSPECIFIED OBESITY TYPE (H): ICD-10-CM

## 2021-10-26 PROCEDURE — 76816 OB US FOLLOW-UP PER FETUS: CPT

## 2021-10-26 PROCEDURE — 76816 OB US FOLLOW-UP PER FETUS: CPT | Mod: 26 | Performed by: OBSTETRICS & GYNECOLOGY

## 2021-10-26 NOTE — PROGRESS NOTES
"Please see \"Imaging\" tab under \"Chart Review\" for details of today's US at the [facility].    Anjum Winslow MD  Maternal-Fetal Medicine    "

## 2021-10-28 ENCOUNTER — PRENATAL OFFICE VISIT (OUTPATIENT)
Dept: OBGYN | Facility: CLINIC | Age: 36
End: 2021-10-28
Payer: COMMERCIAL

## 2021-10-28 VITALS — DIASTOLIC BLOOD PRESSURE: 84 MMHG | WEIGHT: 258 LBS | SYSTOLIC BLOOD PRESSURE: 128 MMHG | BODY MASS INDEX: 47.19 KG/M2

## 2021-10-28 DIAGNOSIS — F41.9 ANXIETY: ICD-10-CM

## 2021-10-28 DIAGNOSIS — O21.0 HYPEREMESIS GRAVIDARUM: ICD-10-CM

## 2021-10-28 DIAGNOSIS — O10.919 CHRONIC HYPERTENSION IN PREGNANCY: Primary | ICD-10-CM

## 2021-10-28 PROCEDURE — 99207 PR PRENATAL VISIT: CPT | Performed by: OBSTETRICS & GYNECOLOGY

## 2021-10-28 RX ORDER — CITALOPRAM HYDROBROMIDE 10 MG/1
10 TABLET ORAL DAILY
Qty: 30 TABLET | Refills: 4 | Status: SHIPPED | OUTPATIENT
Start: 2021-10-28

## 2021-10-28 NOTE — NURSING NOTE
"Chief Complaint   Patient presents with     Prenatal Care       Initial /84   Wt 117 kg (258 lb)   LMP 2021   Breastfeeding No   BMI 47.19 kg/m   Estimated body mass index is 47.19 kg/m  as calculated from the following:    Height as of 6/10/21: 1.575 m (5' 2\").    Weight as of this encounter: 117 kg (258 lb).  BP completed using cuff size: regular    Questioned patient about current smoking habits.  Pt. has never smoked.          31w6d  + FM daily  + cramping/gas/hb frequently  - bleeding  - edema  - headache  Nataliya Wen LPN                 "

## 2021-10-28 NOTE — PROGRESS NOTES
PROBLEM LIST  LABS: Oneg/RI GIRL    1. Chronic hypertension: on baby ASA. Baseline labs normal. Level II US within normal limits. Weekly testing at 32 weeks. Deliver at 38w.  2. BMI 45: level II US. Weekly testing as above. Delivery at 38 weeks for cHTN as above.    Doing well.  Labs and GCT today.  Tdap: done.  Flu: thinking about it. Discussed in detail today, recommended vaccination, discussed measures to decrease risk of exposure and also discussed prophylactic and therapeutic antivirals.  COVID: not planning vaccination. Recommended and discussed.  Has peds. Planning to breastfeed but may supplement or pump as this has not gone smoothly with her prior children.  Follow up growth scan was good.   Anxiety is worsening, she is tearful discussing it today.  After a discussion of all options, we are going to start Celexa at 10 mg daily, and will likely increase to 20 mg daily at delivery for the postpartum period as she has a history of postpartum depression.    Gale Beck MD

## 2021-11-10 ENCOUNTER — HOSPITAL ENCOUNTER (OUTPATIENT)
Dept: ULTRASOUND IMAGING | Facility: CLINIC | Age: 36
End: 2021-11-10
Attending: OBSTETRICS & GYNECOLOGY
Payer: COMMERCIAL

## 2021-11-10 ENCOUNTER — OFFICE VISIT (OUTPATIENT)
Dept: MATERNAL FETAL MEDICINE | Facility: CLINIC | Age: 36
End: 2021-11-10
Attending: OBSTETRICS & GYNECOLOGY
Payer: COMMERCIAL

## 2021-11-10 DIAGNOSIS — O10.919 CHRONIC HYPERTENSION AFFECTING PREGNANCY: ICD-10-CM

## 2021-11-10 DIAGNOSIS — O10.919 CHRONIC HYPERTENSION AFFECTING PREGNANCY: Primary | ICD-10-CM

## 2021-11-10 PROCEDURE — 76819 FETAL BIOPHYS PROFIL W/O NST: CPT

## 2021-11-10 PROCEDURE — 76819 FETAL BIOPHYS PROFIL W/O NST: CPT | Mod: 26 | Performed by: OBSTETRICS & GYNECOLOGY

## 2021-11-16 ENCOUNTER — OFFICE VISIT (OUTPATIENT)
Dept: MATERNAL FETAL MEDICINE | Facility: CLINIC | Age: 36
End: 2021-11-16
Attending: OBSTETRICS & GYNECOLOGY
Payer: COMMERCIAL

## 2021-11-16 ENCOUNTER — HOSPITAL ENCOUNTER (OUTPATIENT)
Dept: ULTRASOUND IMAGING | Facility: CLINIC | Age: 36
End: 2021-11-16
Attending: OBSTETRICS & GYNECOLOGY
Payer: COMMERCIAL

## 2021-11-16 DIAGNOSIS — O10.919 CHRONIC HYPERTENSION AFFECTING PREGNANCY: ICD-10-CM

## 2021-11-16 DIAGNOSIS — O10.919 CHRONIC HYPERTENSION AFFECTING PREGNANCY: Primary | ICD-10-CM

## 2021-11-16 PROCEDURE — 76819 FETAL BIOPHYS PROFIL W/O NST: CPT | Mod: 26 | Performed by: OBSTETRICS & GYNECOLOGY

## 2021-11-16 PROCEDURE — 76819 FETAL BIOPHYS PROFIL W/O NST: CPT

## 2021-11-16 NOTE — PROGRESS NOTES
Please see full imaging report from [employer] program under imaging tab.    BPP 8/8    Nelson Grimm MD  Maternal Fetal Medicine

## 2021-11-23 ENCOUNTER — PRENATAL OFFICE VISIT (OUTPATIENT)
Dept: OBGYN | Facility: CLINIC | Age: 36
End: 2021-11-23
Payer: COMMERCIAL

## 2021-11-23 ENCOUNTER — OFFICE VISIT (OUTPATIENT)
Dept: MATERNAL FETAL MEDICINE | Facility: CLINIC | Age: 36
End: 2021-11-23
Attending: OBSTETRICS & GYNECOLOGY
Payer: COMMERCIAL

## 2021-11-23 ENCOUNTER — HOSPITAL ENCOUNTER (OUTPATIENT)
Dept: ULTRASOUND IMAGING | Facility: CLINIC | Age: 36
End: 2021-11-23
Attending: OBSTETRICS & GYNECOLOGY
Payer: COMMERCIAL

## 2021-11-23 VITALS — WEIGHT: 254 LBS | SYSTOLIC BLOOD PRESSURE: 110 MMHG | DIASTOLIC BLOOD PRESSURE: 60 MMHG | BODY MASS INDEX: 46.46 KG/M2

## 2021-11-23 DIAGNOSIS — Z34.80 PRENATAL CARE, SUBSEQUENT PREGNANCY, UNSPECIFIED TRIMESTER: ICD-10-CM

## 2021-11-23 DIAGNOSIS — O10.919 CHRONIC HYPERTENSION IN PREGNANCY: Primary | ICD-10-CM

## 2021-11-23 DIAGNOSIS — O10.919 CHRONIC HYPERTENSION AFFECTING PREGNANCY: ICD-10-CM

## 2021-11-23 DIAGNOSIS — O10.919 CHRONIC HYPERTENSION AFFECTING PREGNANCY: Primary | ICD-10-CM

## 2021-11-23 PROCEDURE — 76819 FETAL BIOPHYS PROFIL W/O NST: CPT | Mod: 26 | Performed by: OBSTETRICS & GYNECOLOGY

## 2021-11-23 PROCEDURE — 76816 OB US FOLLOW-UP PER FETUS: CPT | Mod: 26 | Performed by: OBSTETRICS & GYNECOLOGY

## 2021-11-23 PROCEDURE — 99207 PR PRENATAL VISIT: CPT | Performed by: OBSTETRICS & GYNECOLOGY

## 2021-11-23 PROCEDURE — 76819 FETAL BIOPHYS PROFIL W/O NST: CPT

## 2021-11-23 NOTE — NURSING NOTE
"Chief Complaint   Patient presents with     Prenatal Care       Initial /60   Wt 115.2 kg (254 lb)   LMP 2021   Breastfeeding No   BMI 46.46 kg/m   Estimated body mass index is 46.46 kg/m  as calculated from the following:    Height as of 6/10/21: 1.575 m (5' 2\").    Weight as of this encounter: 115.2 kg (254 lb).  BP completed using cuff size: regular    Questioned patient about current smoking habits.  Pt. has never smoked.          35w4d  + FM daily  - contractions, cramping or bleeding  - headache or heartburn  - edema  + nausea and vomiting  Nataliya Wen LPN                "

## 2021-11-23 NOTE — PROGRESS NOTES
"Please see \"Imaging\" tab under \"Chart Review\" for details of today's US at the [facility].    Anjum Winslow MD  Maternal-Fetal Medicine    "

## 2021-11-23 NOTE — PROGRESS NOTES
PROBLEM LIST  LABS: Oneg/RI GIRL    1. Chronic hypertension: on baby ASA. Baseline labs normal. Level II US within normal limits. Weekly testing at 32 weeks. Deliver at 38w.  2. BMI 45: level II US. Weekly testing as above. Delivery at 38-39 weeks for cHTN as above.    Doing well.  Tdap: done.  Flu: done  COVID: not planning vaccination. Recommended and discussed.  Has peds. Planning to breastfeed but may supplement or pump as this has not gone smoothly with her prior children.  Follow up growth scan was good.   Anxiety: on Celexa at 10 mg daily, and will likely increase to 20 mg daily at delivery for the postpartum period as she has a history of postpartum depression.    Feeling well. Growth and BPP this morning, reviewed. Plan follow up weekly, Group B Strep next week. IOL at 38-39 weeks, discussed.    Gale Beck MD

## 2021-11-30 ENCOUNTER — OFFICE VISIT (OUTPATIENT)
Dept: MATERNAL FETAL MEDICINE | Facility: CLINIC | Age: 36
End: 2021-11-30
Attending: OBSTETRICS & GYNECOLOGY
Payer: COMMERCIAL

## 2021-11-30 ENCOUNTER — HOSPITAL ENCOUNTER (OUTPATIENT)
Dept: ULTRASOUND IMAGING | Facility: CLINIC | Age: 36
End: 2021-11-30
Attending: OBSTETRICS & GYNECOLOGY
Payer: COMMERCIAL

## 2021-11-30 DIAGNOSIS — O10.919 CHRONIC HYPERTENSION AFFECTING PREGNANCY: ICD-10-CM

## 2021-11-30 DIAGNOSIS — O10.919 CHRONIC HYPERTENSION AFFECTING PREGNANCY: Primary | ICD-10-CM

## 2021-11-30 DIAGNOSIS — O99.213 OBESITY AFFECTING PREGNANCY IN THIRD TRIMESTER: ICD-10-CM

## 2021-11-30 PROCEDURE — 76819 FETAL BIOPHYS PROFIL W/O NST: CPT | Mod: 26 | Performed by: OBSTETRICS & GYNECOLOGY

## 2021-11-30 PROCEDURE — 76819 FETAL BIOPHYS PROFIL W/O NST: CPT

## 2021-11-30 NOTE — PROGRESS NOTES
Layne Blas was seen for an ultrasound today at the M[facility]Fetal Medicine center.      For the details of the ultrasound please see the report which can be found under the imaging tab.      Ivy Ford MD  , OB/GYN  Maternal-Fetal Medicine  [email or web address]  [phone] (Main M Office)  [phone] or [phone] (for 24 hour MFM questions)  [phone] (Pager)

## 2021-12-02 ENCOUNTER — PRENATAL OFFICE VISIT (OUTPATIENT)
Dept: OBGYN | Facility: CLINIC | Age: 36
End: 2021-12-02
Payer: COMMERCIAL

## 2021-12-02 VITALS — WEIGHT: 259 LBS | DIASTOLIC BLOOD PRESSURE: 80 MMHG | BODY MASS INDEX: 47.37 KG/M2 | SYSTOLIC BLOOD PRESSURE: 122 MMHG

## 2021-12-02 DIAGNOSIS — O10.919 CHRONIC HYPERTENSION IN PREGNANCY: Primary | ICD-10-CM

## 2021-12-02 DIAGNOSIS — Z34.80 PRENATAL CARE, SUBSEQUENT PREGNANCY, UNSPECIFIED TRIMESTER: ICD-10-CM

## 2021-12-02 PROCEDURE — 99207 PR PRENATAL VISIT: CPT | Performed by: OBSTETRICS & GYNECOLOGY

## 2021-12-02 PROCEDURE — 87653 STREP B DNA AMP PROBE: CPT | Performed by: OBSTETRICS & GYNECOLOGY

## 2021-12-02 NOTE — PROGRESS NOTES
PROBLEM LIST  LABS: Oneg/RI GIRL    1. Chronic hypertension: on baby ASA. Baseline labs normal. Level II US within normal limits. Weekly testing at 32 weeks. Deliver at 38-39w.  2. BMI 45: level II US. Weekly testing as above. Delivery at 38-39 weeks for cHTN as above.    Doing well.  Tdap: done.  Flu: done  COVID: not planning vaccination. Recommended and discussed.  Has peds. Planning to breastfeed but may supplement or pump as this has not gone smoothly with her prior children.  Follow up growth scan was good.   Anxiety: on Celexa at 10 mg daily, and will likely increase to 20 mg daily at delivery for the postpartum period as she has a history of postpartum depression.    Feeling well. BPP 11/30, reviewed. Plan follow up weekly, Group B Strep today. IOL at 38-39 weeks, discussed.    Gale Beck MD

## 2021-12-02 NOTE — NURSING NOTE
"Chief Complaint   Patient presents with     Prenatal Care       Initial /80   Wt 117.5 kg (259 lb)   LMP 2021   Breastfeeding No   BMI 47.37 kg/m   Estimated body mass index is 47.37 kg/m  as calculated from the following:    Height as of 6/10/21: 1.575 m (5' 2\").    Weight as of this encounter: 117.5 kg (259 lb).  BP completed using cuff size: regular    Questioned patient about current smoking habits.  Pt. has never smoked.          36w6d  + FM daily  - contractions  - bleeding  No concerns  Nataliya Wen LPN               "

## 2021-12-03 LAB
GP B STREP DNA SPEC QL NAA+PROBE: NEGATIVE
PATIENT PENICILLIN, AMOXICILLIN, CEPHALOSPORINS ALLERGY: NO

## 2021-12-09 ENCOUNTER — PRENATAL OFFICE VISIT (OUTPATIENT)
Dept: OBGYN | Facility: CLINIC | Age: 36
End: 2021-12-09
Payer: COMMERCIAL

## 2021-12-09 VITALS — SYSTOLIC BLOOD PRESSURE: 120 MMHG | WEIGHT: 257 LBS | DIASTOLIC BLOOD PRESSURE: 72 MMHG | BODY MASS INDEX: 47.01 KG/M2

## 2021-12-09 DIAGNOSIS — O10.919 CHRONIC HYPERTENSION IN PREGNANCY: Primary | ICD-10-CM

## 2021-12-09 DIAGNOSIS — Z34.80 PRENATAL CARE, SUBSEQUENT PREGNANCY, UNSPECIFIED TRIMESTER: ICD-10-CM

## 2021-12-09 LAB — SARS-COV-2 RNA RESP QL NAA+PROBE: NEGATIVE

## 2021-12-09 PROCEDURE — U0005 INFEC AGEN DETEC AMPLI PROBE: HCPCS | Performed by: OBSTETRICS & GYNECOLOGY

## 2021-12-09 PROCEDURE — 99207 PR PRENATAL VISIT: CPT | Performed by: OBSTETRICS & GYNECOLOGY

## 2021-12-09 PROCEDURE — U0003 INFECTIOUS AGENT DETECTION BY NUCLEIC ACID (DNA OR RNA); SEVERE ACUTE RESPIRATORY SYNDROME CORONAVIRUS 2 (SARS-COV-2) (CORONAVIRUS DISEASE [COVID-19]), AMPLIFIED PROBE TECHNIQUE, MAKING USE OF HIGH THROUGHPUT TECHNOLOGIES AS DESCRIBED BY CMS-2020-01-R: HCPCS | Performed by: OBSTETRICS & GYNECOLOGY

## 2021-12-09 NOTE — PROGRESS NOTES
PROBLEM LIST  LABS: Oneg/RI GIRL    1. Chronic hypertension: on baby ASA. Baseline labs normal. Level II US within normal limits. Weekly testing at 32 weeks. Deliver at 38-39w.  2. BMI 45: level II US. Weekly testing as above. Delivery at 38-39 weeks for cHTN as above.    Doing well.  Tdap: done.  Flu: done  COVID: not planning vaccination. Recommended and discussed.  Has peds. Planning to breastfeed but may supplement or pump as this has not gone smoothly with her prior children.  Follow up growth scan was good.   Anxiety: on Celexa at 10 mg daily, and will likely increase to 20 mg daily at delivery for the postpartum period as she has a history of postpartum depression.    Feeling well but uncomfortable. Discussed induction of labor for chronic hypertension at 38 weeks on Friday, she agrees and would like to schedule ASAP at or after 38 weeks. L&D was contacted and this was arranged for Friday.    Gale Beck MD

## 2021-12-09 NOTE — NURSING NOTE
"Chief Complaint   Patient presents with     Prenatal Care       Initial /72   Wt 116.6 kg (257 lb)   LMP 2021   Breastfeeding No   BMI 47.01 kg/m   Estimated body mass index is 47.01 kg/m  as calculated from the following:    Height as of 6/10/21: 1.575 m (5' 2\").    Weight as of this encounter: 116.6 kg (257 lb).  BP completed using cuff size: regular    Questioned patient about current smoking habits.  Pt. has never smoked.          37w6d  + FM daily  - cramping or bleeding  - contractions  Nataliya Wen LPN             "

## 2021-12-09 NOTE — NURSING NOTE
Procedure: Induction  Date: 12/10/21  Time: 7:30am  Hospital: [facility]    Orders faxed and the patient was advised to call [facility] [phone] labor and delivery department one hour prior to arrival.

## 2021-12-10 ENCOUNTER — ANESTHESIA EVENT (OUTPATIENT)
Dept: OBGYN | Facility: CLINIC | Age: 36
End: 2021-12-10
Payer: COMMERCIAL

## 2021-12-10 ENCOUNTER — HOSPITAL ENCOUNTER (INPATIENT)
Facility: CLINIC | Age: 36
LOS: 3 days | Discharge: HOME | End: 2021-12-13
Attending: OBSTETRICS & GYNECOLOGY | Admitting: OBSTETRICS & GYNECOLOGY
Payer: COMMERCIAL

## 2021-12-10 ENCOUNTER — ANESTHESIA (OUTPATIENT)
Dept: OBGYN | Facility: CLINIC | Age: 36
End: 2021-12-10
Payer: COMMERCIAL

## 2021-12-10 DIAGNOSIS — Z98.890 POSTOPERATIVE STATE: ICD-10-CM

## 2021-12-10 LAB
ABO/RH(D): NORMAL
ANTIBODY SCREEN: NEGATIVE
SPECIMEN EXPIRATION DATE: NORMAL
T PALLIDUM AB SER QL: NONREACTIVE

## 2021-12-10 PROCEDURE — 250N000009 HC RX 250: Performed by: OBSTETRICS & GYNECOLOGY

## 2021-12-10 PROCEDURE — 120N000001 HC R&B MED SURG/OB

## 2021-12-10 PROCEDURE — 250N000011 HC RX IP 250 OP 636: Performed by: OBSTETRICS & GYNECOLOGY

## 2021-12-10 PROCEDURE — 86900 BLOOD TYPING SEROLOGIC ABO: CPT | Performed by: OBSTETRICS & GYNECOLOGY

## 2021-12-10 PROCEDURE — 86780 TREPONEMA PALLIDUM: CPT | Performed by: OBSTETRICS & GYNECOLOGY

## 2021-12-10 PROCEDURE — 258N000003 HC RX IP 258 OP 636: Performed by: OBSTETRICS & GYNECOLOGY

## 2021-12-10 RX ORDER — OXYTOCIN/0.9 % SODIUM CHLORIDE 30/500 ML
100-340 PLASTIC BAG, INJECTION (ML) INTRAVENOUS CONTINUOUS PRN
Status: DISCONTINUED | OUTPATIENT
Start: 2021-12-10 | End: 2021-12-13 | Stop reason: CLARIF

## 2021-12-10 RX ORDER — SODIUM CHLORIDE, SODIUM LACTATE, POTASSIUM CHLORIDE, CALCIUM CHLORIDE 600; 310; 30; 20 MG/100ML; MG/100ML; MG/100ML; MG/100ML
INJECTION, SOLUTION INTRAVENOUS CONTINUOUS PRN
Status: DISCONTINUED | OUTPATIENT
Start: 2021-12-10 | End: 2021-12-11 | Stop reason: HOSPADM

## 2021-12-10 RX ORDER — ONDANSETRON 2 MG/ML
4 INJECTION INTRAMUSCULAR; INTRAVENOUS EVERY 6 HOURS PRN
Status: DISCONTINUED | OUTPATIENT
Start: 2021-12-10 | End: 2021-12-11 | Stop reason: HOSPADM

## 2021-12-10 RX ORDER — EPHEDRINE SULFATE 50 MG/ML
5 INJECTION, SOLUTION INTRAMUSCULAR; INTRAVENOUS; SUBCUTANEOUS
Status: DISCONTINUED | OUTPATIENT
Start: 2021-12-10 | End: 2021-12-11 | Stop reason: HOSPADM

## 2021-12-10 RX ORDER — NALOXONE HYDROCHLORIDE 0.4 MG/ML
0.2 INJECTION, SOLUTION INTRAMUSCULAR; INTRAVENOUS; SUBCUTANEOUS
Status: DISCONTINUED | OUTPATIENT
Start: 2021-12-10 | End: 2021-12-11 | Stop reason: HOSPADM

## 2021-12-10 RX ORDER — PROCHLORPERAZINE 25 MG
25 SUPPOSITORY, RECTAL RECTAL EVERY 12 HOURS PRN
Status: DISCONTINUED | OUTPATIENT
Start: 2021-12-10 | End: 2021-12-11 | Stop reason: HOSPADM

## 2021-12-10 RX ORDER — ONDANSETRON 4 MG/1
4 TABLET, ORALLY DISINTEGRATING ORAL EVERY 6 HOURS PRN
Status: DISCONTINUED | OUTPATIENT
Start: 2021-12-10 | End: 2021-12-11 | Stop reason: HOSPADM

## 2021-12-10 RX ORDER — KETOROLAC TROMETHAMINE 30 MG/ML
30 INJECTION, SOLUTION INTRAMUSCULAR; INTRAVENOUS
Status: DISCONTINUED | OUTPATIENT
Start: 2021-12-10 | End: 2021-12-11

## 2021-12-10 RX ORDER — OXYTOCIN/0.9 % SODIUM CHLORIDE 30/500 ML
1-24 PLASTIC BAG, INJECTION (ML) INTRAVENOUS CONTINUOUS
Status: DISCONTINUED | OUTPATIENT
Start: 2021-12-10 | End: 2021-12-11 | Stop reason: HOSPADM

## 2021-12-10 RX ORDER — OXYTOCIN 10 [USP'U]/ML
10 INJECTION, SOLUTION INTRAMUSCULAR; INTRAVENOUS
Status: DISCONTINUED | OUTPATIENT
Start: 2021-12-10 | End: 2021-12-13 | Stop reason: CLARIF

## 2021-12-10 RX ORDER — METOCLOPRAMIDE 10 MG/1
10 TABLET ORAL EVERY 6 HOURS PRN
Status: DISCONTINUED | OUTPATIENT
Start: 2021-12-10 | End: 2021-12-11 | Stop reason: HOSPADM

## 2021-12-10 RX ORDER — CARBOPROST TROMETHAMINE 250 UG/ML
250 INJECTION, SOLUTION INTRAMUSCULAR
Status: DISCONTINUED | OUTPATIENT
Start: 2021-12-10 | End: 2021-12-11 | Stop reason: HOSPADM

## 2021-12-10 RX ORDER — OXYTOCIN 10 [USP'U]/ML
10 INJECTION, SOLUTION INTRAMUSCULAR; INTRAVENOUS
Status: DISCONTINUED | OUTPATIENT
Start: 2021-12-10 | End: 2021-12-11 | Stop reason: HOSPADM

## 2021-12-10 RX ORDER — PROCHLORPERAZINE MALEATE 10 MG
10 TABLET ORAL EVERY 6 HOURS PRN
Status: DISCONTINUED | OUTPATIENT
Start: 2021-12-10 | End: 2021-12-11 | Stop reason: HOSPADM

## 2021-12-10 RX ORDER — TRANEXAMIC ACID 10 MG/ML
1 INJECTION, SOLUTION INTRAVENOUS EVERY 30 MIN PRN
Status: DISCONTINUED | OUTPATIENT
Start: 2021-12-10 | End: 2021-12-11 | Stop reason: HOSPADM

## 2021-12-10 RX ORDER — NALOXONE HYDROCHLORIDE 0.4 MG/ML
0.4 INJECTION, SOLUTION INTRAMUSCULAR; INTRAVENOUS; SUBCUTANEOUS
Status: DISCONTINUED | OUTPATIENT
Start: 2021-12-10 | End: 2021-12-11 | Stop reason: HOSPADM

## 2021-12-10 RX ORDER — NALBUPHINE HYDROCHLORIDE 10 MG/ML
2.5-5 INJECTION, SOLUTION INTRAMUSCULAR; INTRAVENOUS; SUBCUTANEOUS EVERY 6 HOURS PRN
Status: DISCONTINUED | OUTPATIENT
Start: 2021-12-10 | End: 2021-12-13 | Stop reason: HOSPADM

## 2021-12-10 RX ORDER — MISOPROSTOL 200 UG/1
400 TABLET ORAL
Status: DISCONTINUED | OUTPATIENT
Start: 2021-12-10 | End: 2021-12-11 | Stop reason: HOSPADM

## 2021-12-10 RX ORDER — FENTANYL CITRATE 50 UG/ML
50-100 INJECTION, SOLUTION INTRAMUSCULAR; INTRAVENOUS
Status: DISCONTINUED | OUTPATIENT
Start: 2021-12-10 | End: 2021-12-11 | Stop reason: HOSPADM

## 2021-12-10 RX ORDER — OXYTOCIN/0.9 % SODIUM CHLORIDE 30/500 ML
340 PLASTIC BAG, INJECTION (ML) INTRAVENOUS CONTINUOUS PRN
Status: DISCONTINUED | OUTPATIENT
Start: 2021-12-10 | End: 2021-12-11 | Stop reason: HOSPADM

## 2021-12-10 RX ORDER — IBUPROFEN 600 MG/1
600 TABLET, FILM COATED ORAL
Status: DISCONTINUED | OUTPATIENT
Start: 2021-12-10 | End: 2021-12-11

## 2021-12-10 RX ORDER — METHYLERGONOVINE MALEATE 0.2 MG/ML
200 INJECTION INTRAVENOUS
Status: DISCONTINUED | OUTPATIENT
Start: 2021-12-10 | End: 2021-12-11 | Stop reason: HOSPADM

## 2021-12-10 RX ORDER — METOCLOPRAMIDE HYDROCHLORIDE 5 MG/ML
10 INJECTION INTRAMUSCULAR; INTRAVENOUS EVERY 6 HOURS PRN
Status: DISCONTINUED | OUTPATIENT
Start: 2021-12-10 | End: 2021-12-11 | Stop reason: HOSPADM

## 2021-12-10 RX ORDER — MISOPROSTOL 200 UG/1
800 TABLET ORAL
Status: DISCONTINUED | OUTPATIENT
Start: 2021-12-10 | End: 2021-12-11 | Stop reason: HOSPADM

## 2021-12-10 RX ADMIN — Medication 1 MILLI-UNITS/MIN: at 21:02

## 2021-12-10 RX ADMIN — SODIUM CHLORIDE, POTASSIUM CHLORIDE, SODIUM LACTATE AND CALCIUM CHLORIDE: 600; 310; 30; 20 INJECTION, SOLUTION INTRAVENOUS at 15:33

## 2021-12-10 RX ADMIN — ONDANSETRON 4 MG: 2 INJECTION INTRAMUSCULAR; INTRAVENOUS at 15:33

## 2021-12-10 RX ADMIN — Medication 1 MILLI-UNITS/MIN: at 10:00

## 2021-12-10 RX ADMIN — METOCLOPRAMIDE HYDROCHLORIDE 10 MG: 5 INJECTION INTRAMUSCULAR; INTRAVENOUS at 20:39

## 2021-12-10 RX ADMIN — SODIUM CHLORIDE, POTASSIUM CHLORIDE, SODIUM LACTATE AND CALCIUM CHLORIDE: 600; 310; 30; 20 INJECTION, SOLUTION INTRAVENOUS at 21:54

## 2021-12-10 ASSESSMENT — ACTIVITIES OF DAILY LIVING (ADL)
ADLS_ACUITY_SCORE: 5

## 2021-12-10 NOTE — PROVIDER NOTIFICATION
12/10/21 0933   Provider Notification   Provider Name/Title Dr Santoro   Method of Notification At Bedside   Request Evaluate in Person     MD at bedside. Reviewed FHR tracing, AROM with small amount of clear fluid. FSE placed by MD. Orders to start pitocin at 1ml/unit. Call if needed

## 2021-12-10 NOTE — PROVIDER NOTIFICATION
12/10/21 1743   Provider Notification   Provider Name/Title Dr Santoro   Method of Notification In Department   Request Evaluate - Remote   Notification Reason Myron ROMERO in department reviewed recent variable decel. Orders to check SVE

## 2021-12-10 NOTE — PLAN OF CARE
at 38.0 weeks arrives to floor for induction of labor related to CHTN. Pt feels baby move, denies LOF vaginal bleeding or abdominal pain. Monitors applied, admission information gathered and assessment completed. Will call Dr Santoro for further orders.

## 2021-12-10 NOTE — PROVIDER NOTIFICATION
12/10/21 1630   Provider Notification   Provider Name/Title Dr Santoro   Method of Notification In Department   Request Evaluate - Remote   Notification Reason Status Update     MD in department, updated on FHR and contraction pattern. Notified of intermittent variable decels. Updated that pt is occasionally feeling contractions and they palpate mild. Orders to continue to adjust pitocin as able and check SVE when pt becomes more uncomfortable

## 2021-12-10 NOTE — PROVIDER NOTIFICATION
12/10/21 0826   Provider Notification   Provider Name/Title Dr Santoro   Method of Notification Phone   Request Evaluate - Remote   Notification Reason Patient Arrived     MD notified of pt arrival, BP and difficulty keeping baby on monitor. Intrapartum orders received. MD will plan to come to bedside assess pt and consider AROM with scalp electrode.

## 2021-12-11 LAB
HGB BLD-MCNC: 12.2 G/DL (ref 11.7–15.7)
PLATELET # BLD AUTO: 228 10E3/UL (ref 150–450)

## 2021-12-11 PROCEDURE — 360N000076 HC SURGERY LEVEL 3, PER MIN: Performed by: OBSTETRICS & GYNECOLOGY

## 2021-12-11 PROCEDURE — 250N000011 HC RX IP 250 OP 636: Performed by: OBSTETRICS & GYNECOLOGY

## 2021-12-11 PROCEDURE — 258N000003 HC RX IP 258 OP 636: Performed by: OBSTETRICS & GYNECOLOGY

## 2021-12-11 PROCEDURE — 250N000013 HC RX MED GY IP 250 OP 250 PS 637: Performed by: OBSTETRICS & GYNECOLOGY

## 2021-12-11 PROCEDURE — 250N000011 HC RX IP 250 OP 636: Performed by: ANESTHESIOLOGY

## 2021-12-11 PROCEDURE — 250N000011 HC RX IP 250 OP 636: Performed by: NURSE ANESTHETIST, CERTIFIED REGISTERED

## 2021-12-11 PROCEDURE — 120N000001 HC R&B MED SURG/OB

## 2021-12-11 PROCEDURE — 85049 AUTOMATED PLATELET COUNT: CPT | Performed by: OBSTETRICS & GYNECOLOGY

## 2021-12-11 PROCEDURE — 59510 CESAREAN DELIVERY: CPT | Performed by: OBSTETRICS & GYNECOLOGY

## 2021-12-11 PROCEDURE — 85018 HEMOGLOBIN: CPT | Performed by: OBSTETRICS & GYNECOLOGY

## 2021-12-11 PROCEDURE — 258N000003 HC RX IP 258 OP 636: Performed by: NURSE ANESTHETIST, CERTIFIED REGISTERED

## 2021-12-11 PROCEDURE — 370N000017 HC ANESTHESIA TECHNICAL FEE, PER MIN: Performed by: OBSTETRICS & GYNECOLOGY

## 2021-12-11 PROCEDURE — 999N000016 HC STATISTIC ATTENDANCE AT DELIVERY

## 2021-12-11 PROCEDURE — 710N000009 HC RECOVERY PHASE 1, LEVEL 1, PER MIN: Performed by: OBSTETRICS & GYNECOLOGY

## 2021-12-11 PROCEDURE — 250N000009 HC RX 250: Performed by: ANESTHESIOLOGY

## 2021-12-11 PROCEDURE — 36415 COLL VENOUS BLD VENIPUNCTURE: CPT | Performed by: OBSTETRICS & GYNECOLOGY

## 2021-12-11 PROCEDURE — 250N000009 HC RX 250: Performed by: NURSE ANESTHETIST, CERTIFIED REGISTERED

## 2021-12-11 PROCEDURE — 272N000001 HC OR GENERAL SUPPLY STERILE: Performed by: OBSTETRICS & GYNECOLOGY

## 2021-12-11 RX ORDER — IBUPROFEN 800 MG/1
800 TABLET, FILM COATED ORAL EVERY 6 HOURS
Status: DISCONTINUED | OUTPATIENT
Start: 2021-12-12 | End: 2021-12-13 | Stop reason: HOSPADM

## 2021-12-11 RX ORDER — METOCLOPRAMIDE 10 MG/1
10 TABLET ORAL EVERY 6 HOURS PRN
Status: DISCONTINUED | OUTPATIENT
Start: 2021-12-11 | End: 2021-12-13 | Stop reason: HOSPADM

## 2021-12-11 RX ORDER — MISOPROSTOL 200 UG/1
400 TABLET ORAL
Status: DISCONTINUED | OUTPATIENT
Start: 2021-12-11 | End: 2021-12-11 | Stop reason: HOSPADM

## 2021-12-11 RX ORDER — CARBOPROST TROMETHAMINE 250 UG/ML
250 INJECTION, SOLUTION INTRAMUSCULAR
Status: DISCONTINUED | OUTPATIENT
Start: 2021-12-11 | End: 2021-12-11 | Stop reason: HOSPADM

## 2021-12-11 RX ORDER — SIMETHICONE 80 MG
80 TABLET,CHEWABLE ORAL 4 TIMES DAILY PRN
Status: DISCONTINUED | OUTPATIENT
Start: 2021-12-11 | End: 2021-12-13 | Stop reason: HOSPADM

## 2021-12-11 RX ORDER — OXYTOCIN/0.9 % SODIUM CHLORIDE 30/500 ML
100-340 PLASTIC BAG, INJECTION (ML) INTRAVENOUS CONTINUOUS PRN
Status: CANCELLED | OUTPATIENT
Start: 2021-12-11

## 2021-12-11 RX ORDER — METOCLOPRAMIDE HYDROCHLORIDE 5 MG/ML
10 INJECTION INTRAMUSCULAR; INTRAVENOUS EVERY 6 HOURS PRN
Status: DISCONTINUED | OUTPATIENT
Start: 2021-12-11 | End: 2021-12-13 | Stop reason: HOSPADM

## 2021-12-11 RX ORDER — TRANEXAMIC ACID 10 MG/ML
1 INJECTION, SOLUTION INTRAVENOUS EVERY 30 MIN PRN
Status: DISCONTINUED | OUTPATIENT
Start: 2021-12-11 | End: 2021-12-13 | Stop reason: HOSPADM

## 2021-12-11 RX ORDER — DEXTROSE, SODIUM CHLORIDE, SODIUM LACTATE, POTASSIUM CHLORIDE, AND CALCIUM CHLORIDE 5; .6; .31; .03; .02 G/100ML; G/100ML; G/100ML; G/100ML; G/100ML
INJECTION, SOLUTION INTRAVENOUS CONTINUOUS
Status: DISCONTINUED | OUTPATIENT
Start: 2021-12-11 | End: 2021-12-13 | Stop reason: HOSPADM

## 2021-12-11 RX ORDER — BUPIVACAINE HYDROCHLORIDE 7.5 MG/ML
INJECTION, SOLUTION INTRASPINAL PRN
Status: DISCONTINUED | OUTPATIENT
Start: 2021-12-11 | End: 2021-12-11

## 2021-12-11 RX ORDER — NALOXONE HYDROCHLORIDE 0.4 MG/ML
0.2 INJECTION, SOLUTION INTRAMUSCULAR; INTRAVENOUS; SUBCUTANEOUS
Status: DISCONTINUED | OUTPATIENT
Start: 2021-12-11 | End: 2021-12-13 | Stop reason: HOSPADM

## 2021-12-11 RX ORDER — HYDROCORTISONE 2.5 %
CREAM (GRAM) TOPICAL 3 TIMES DAILY PRN
Status: DISCONTINUED | OUTPATIENT
Start: 2021-12-11 | End: 2021-12-13 | Stop reason: HOSPADM

## 2021-12-11 RX ORDER — AZITHROMYCIN 500 MG/5ML
500 INJECTION, POWDER, LYOPHILIZED, FOR SOLUTION INTRAVENOUS
Status: COMPLETED | OUTPATIENT
Start: 2021-12-11 | End: 2021-12-11

## 2021-12-11 RX ORDER — OXYTOCIN/0.9 % SODIUM CHLORIDE 30/500 ML
340 PLASTIC BAG, INJECTION (ML) INTRAVENOUS CONTINUOUS PRN
Status: DISCONTINUED | OUTPATIENT
Start: 2021-12-11 | End: 2021-12-13 | Stop reason: HOSPADM

## 2021-12-11 RX ORDER — PROPOFOL 10 MG/ML
INJECTION, EMULSION INTRAVENOUS PRN
Status: DISCONTINUED | OUTPATIENT
Start: 2021-12-11 | End: 2021-12-11

## 2021-12-11 RX ORDER — OXYTOCIN/0.9 % SODIUM CHLORIDE 30/500 ML
340 PLASTIC BAG, INJECTION (ML) INTRAVENOUS CONTINUOUS PRN
Status: DISCONTINUED | OUTPATIENT
Start: 2021-12-11 | End: 2021-12-11 | Stop reason: HOSPADM

## 2021-12-11 RX ORDER — CARBOPROST TROMETHAMINE 250 UG/ML
250 INJECTION, SOLUTION INTRAMUSCULAR
Status: DISCONTINUED | OUTPATIENT
Start: 2021-12-11 | End: 2021-12-13 | Stop reason: HOSPADM

## 2021-12-11 RX ORDER — LIDOCAINE 40 MG/G
CREAM TOPICAL
Status: DISCONTINUED | OUTPATIENT
Start: 2021-12-11 | End: 2021-12-11 | Stop reason: HOSPADM

## 2021-12-11 RX ORDER — NALOXONE HYDROCHLORIDE 0.4 MG/ML
0.4 INJECTION, SOLUTION INTRAMUSCULAR; INTRAVENOUS; SUBCUTANEOUS
Status: DISCONTINUED | OUTPATIENT
Start: 2021-12-11 | End: 2021-12-13 | Stop reason: HOSPADM

## 2021-12-11 RX ORDER — LIDOCAINE 40 MG/G
CREAM TOPICAL
Status: DISCONTINUED | OUTPATIENT
Start: 2021-12-11 | End: 2021-12-13 | Stop reason: HOSPADM

## 2021-12-11 RX ORDER — OXYCODONE HYDROCHLORIDE 5 MG/1
5 TABLET ORAL EVERY 4 HOURS PRN
Status: DISCONTINUED | OUTPATIENT
Start: 2021-12-11 | End: 2021-12-13 | Stop reason: HOSPADM

## 2021-12-11 RX ORDER — METHYLERGONOVINE MALEATE 0.2 MG/ML
200 INJECTION INTRAVENOUS
Status: DISCONTINUED | OUTPATIENT
Start: 2021-12-11 | End: 2021-12-13 | Stop reason: HOSPADM

## 2021-12-11 RX ORDER — MORPHINE SULFATE 1 MG/ML
INJECTION, SOLUTION EPIDURAL; INTRATHECAL; INTRAVENOUS PRN
Status: DISCONTINUED | OUTPATIENT
Start: 2021-12-11 | End: 2021-12-11

## 2021-12-11 RX ORDER — METHYLERGONOVINE MALEATE 0.2 MG/ML
200 INJECTION INTRAVENOUS
Status: DISCONTINUED | OUTPATIENT
Start: 2021-12-11 | End: 2021-12-11 | Stop reason: HOSPADM

## 2021-12-11 RX ORDER — KETOROLAC TROMETHAMINE 30 MG/ML
30 INJECTION, SOLUTION INTRAMUSCULAR; INTRAVENOUS EVERY 6 HOURS
Status: COMPLETED | OUTPATIENT
Start: 2021-12-11 | End: 2021-12-11

## 2021-12-11 RX ORDER — AMOXICILLIN 250 MG
1 CAPSULE ORAL 2 TIMES DAILY
Status: DISCONTINUED | OUTPATIENT
Start: 2021-12-11 | End: 2021-12-13 | Stop reason: HOSPADM

## 2021-12-11 RX ORDER — MISOPROSTOL 200 UG/1
800 TABLET ORAL
Status: DISCONTINUED | OUTPATIENT
Start: 2021-12-11 | End: 2021-12-13 | Stop reason: HOSPADM

## 2021-12-11 RX ORDER — PROCHLORPERAZINE 25 MG
25 SUPPOSITORY, RECTAL RECTAL EVERY 12 HOURS PRN
Status: DISCONTINUED | OUTPATIENT
Start: 2021-12-11 | End: 2021-12-13 | Stop reason: HOSPADM

## 2021-12-11 RX ORDER — EPHEDRINE SULFATE 50 MG/ML
25 INJECTION, SOLUTION INTRAVENOUS ONCE
Status: COMPLETED | OUTPATIENT
Start: 2021-12-11 | End: 2021-12-11

## 2021-12-11 RX ORDER — CEFAZOLIN SODIUM 2 G/100ML
2 INJECTION, SOLUTION INTRAVENOUS SEE ADMIN INSTRUCTIONS
Status: DISCONTINUED | OUTPATIENT
Start: 2021-12-11 | End: 2021-12-11 | Stop reason: HOSPADM

## 2021-12-11 RX ORDER — HYDROXYZINE HYDROCHLORIDE 25 MG/ML
50 INJECTION, SOLUTION INTRAMUSCULAR ONCE
Status: COMPLETED | OUTPATIENT
Start: 2021-12-11 | End: 2021-12-11

## 2021-12-11 RX ORDER — PROMETHAZINE HYDROCHLORIDE 25 MG/ML
25 INJECTION INTRAMUSCULAR; INTRAVENOUS ONCE
Status: COMPLETED | OUTPATIENT
Start: 2021-12-11 | End: 2021-12-11

## 2021-12-11 RX ORDER — OXYTOCIN 10 [USP'U]/ML
10 INJECTION, SOLUTION INTRAMUSCULAR; INTRAVENOUS
Status: CANCELLED | OUTPATIENT
Start: 2021-12-11

## 2021-12-11 RX ORDER — ACETAMINOPHEN 325 MG/1
975 TABLET ORAL EVERY 6 HOURS
Status: DISCONTINUED | OUTPATIENT
Start: 2021-12-11 | End: 2021-12-13 | Stop reason: HOSPADM

## 2021-12-11 RX ORDER — KETAMINE HYDROCHLORIDE 10 MG/ML
INJECTION INTRAMUSCULAR; INTRAVENOUS PRN
Status: DISCONTINUED | OUTPATIENT
Start: 2021-12-11 | End: 2021-12-11

## 2021-12-11 RX ORDER — BISACODYL 10 MG
10 SUPPOSITORY, RECTAL RECTAL DAILY PRN
Status: DISCONTINUED | OUTPATIENT
Start: 2021-12-13 | End: 2021-12-13 | Stop reason: HOSPADM

## 2021-12-11 RX ORDER — PROCHLORPERAZINE MALEATE 10 MG
10 TABLET ORAL EVERY 6 HOURS PRN
Status: DISCONTINUED | OUTPATIENT
Start: 2021-12-11 | End: 2021-12-13 | Stop reason: HOSPADM

## 2021-12-11 RX ORDER — FENTANYL CITRATE 50 UG/ML
INJECTION, SOLUTION INTRAMUSCULAR; INTRAVENOUS PRN
Status: DISCONTINUED | OUTPATIENT
Start: 2021-12-11 | End: 2021-12-11

## 2021-12-11 RX ORDER — TRANEXAMIC ACID 10 MG/ML
1 INJECTION, SOLUTION INTRAVENOUS EVERY 30 MIN PRN
Status: DISCONTINUED | OUTPATIENT
Start: 2021-12-11 | End: 2021-12-11 | Stop reason: HOSPADM

## 2021-12-11 RX ORDER — SODIUM CHLORIDE, SODIUM LACTATE, POTASSIUM CHLORIDE, CALCIUM CHLORIDE 600; 310; 30; 20 MG/100ML; MG/100ML; MG/100ML; MG/100ML
INJECTION, SOLUTION INTRAVENOUS CONTINUOUS
Status: DISCONTINUED | OUTPATIENT
Start: 2021-12-11 | End: 2021-12-11 | Stop reason: HOSPADM

## 2021-12-11 RX ORDER — OXYTOCIN 10 [USP'U]/ML
10 INJECTION, SOLUTION INTRAMUSCULAR; INTRAVENOUS
Status: DISCONTINUED | OUTPATIENT
Start: 2021-12-11 | End: 2021-12-11 | Stop reason: HOSPADM

## 2021-12-11 RX ORDER — AMOXICILLIN 250 MG
2 CAPSULE ORAL 2 TIMES DAILY
Status: DISCONTINUED | OUTPATIENT
Start: 2021-12-11 | End: 2021-12-13 | Stop reason: HOSPADM

## 2021-12-11 RX ORDER — ONDANSETRON 4 MG/1
4 TABLET, ORALLY DISINTEGRATING ORAL EVERY 6 HOURS PRN
Status: DISCONTINUED | OUTPATIENT
Start: 2021-12-11 | End: 2021-12-13 | Stop reason: HOSPADM

## 2021-12-11 RX ORDER — ACETAMINOPHEN 325 MG/1
975 TABLET ORAL ONCE
Status: COMPLETED | OUTPATIENT
Start: 2021-12-11 | End: 2021-12-11

## 2021-12-11 RX ORDER — OXYTOCIN 10 [USP'U]/ML
10 INJECTION, SOLUTION INTRAMUSCULAR; INTRAVENOUS
Status: DISCONTINUED | OUTPATIENT
Start: 2021-12-11 | End: 2021-12-13 | Stop reason: HOSPADM

## 2021-12-11 RX ORDER — ONDANSETRON 2 MG/ML
4 INJECTION INTRAMUSCULAR; INTRAVENOUS EVERY 6 HOURS PRN
Status: DISCONTINUED | OUTPATIENT
Start: 2021-12-11 | End: 2021-12-13 | Stop reason: HOSPADM

## 2021-12-11 RX ORDER — KETOROLAC TROMETHAMINE 30 MG/ML
INJECTION, SOLUTION INTRAMUSCULAR; INTRAVENOUS PRN
Status: DISCONTINUED | OUTPATIENT
Start: 2021-12-11 | End: 2021-12-11

## 2021-12-11 RX ORDER — CEFAZOLIN SODIUM 2 G/100ML
2 INJECTION, SOLUTION INTRAVENOUS
Status: COMPLETED | OUTPATIENT
Start: 2021-12-11 | End: 2021-12-11

## 2021-12-11 RX ORDER — MODIFIED LANOLIN
OINTMENT (GRAM) TOPICAL
Status: DISCONTINUED | OUTPATIENT
Start: 2021-12-11 | End: 2021-12-13 | Stop reason: HOSPADM

## 2021-12-11 RX ORDER — MISOPROSTOL 200 UG/1
400 TABLET ORAL
Status: DISCONTINUED | OUTPATIENT
Start: 2021-12-11 | End: 2021-12-13 | Stop reason: HOSPADM

## 2021-12-11 RX ORDER — CITRIC ACID/SODIUM CITRATE 334-500MG
30 SOLUTION, ORAL ORAL
Status: COMPLETED | OUTPATIENT
Start: 2021-12-11 | End: 2021-12-11

## 2021-12-11 RX ORDER — OXYTOCIN/0.9 % SODIUM CHLORIDE 30/500 ML
PLASTIC BAG, INJECTION (ML) INTRAVENOUS PRN
Status: DISCONTINUED | OUTPATIENT
Start: 2021-12-11 | End: 2021-12-11

## 2021-12-11 RX ORDER — MISOPROSTOL 200 UG/1
800 TABLET ORAL
Status: DISCONTINUED | OUTPATIENT
Start: 2021-12-11 | End: 2021-12-11 | Stop reason: HOSPADM

## 2021-12-11 RX ADMIN — MIDAZOLAM 2 MG: 1 INJECTION INTRAMUSCULAR; INTRAVENOUS at 01:17

## 2021-12-11 RX ADMIN — SODIUM CHLORIDE, SODIUM LACTATE, POTASSIUM CHLORIDE, CALCIUM CHLORIDE AND DEXTROSE MONOHYDRATE: 5; 600; 310; 30; 20 INJECTION, SOLUTION INTRAVENOUS at 05:09

## 2021-12-11 RX ADMIN — SODIUM CHLORIDE, POTASSIUM CHLORIDE, SODIUM LACTATE AND CALCIUM CHLORIDE: 600; 310; 30; 20 INJECTION, SOLUTION INTRAVENOUS at 00:42

## 2021-12-11 RX ADMIN — KETOROLAC TROMETHAMINE 30 MG: 30 INJECTION, SOLUTION INTRAMUSCULAR at 20:20

## 2021-12-11 RX ADMIN — FENTANYL CITRATE 50 MCG: 50 INJECTION, SOLUTION INTRAMUSCULAR; INTRAVENOUS at 01:36

## 2021-12-11 RX ADMIN — PROCHLORPERAZINE EDISYLATE 10 MG: 5 INJECTION INTRAMUSCULAR; INTRAVENOUS at 02:28

## 2021-12-11 RX ADMIN — ACETAMINOPHEN 975 MG: 325 TABLET, FILM COATED ORAL at 08:12

## 2021-12-11 RX ADMIN — ENOXAPARIN SODIUM 40 MG: 40 INJECTION SUBCUTANEOUS at 13:55

## 2021-12-11 RX ADMIN — PROPOFOL 25 MG: 10 INJECTION, EMULSION INTRAVENOUS at 01:43

## 2021-12-11 RX ADMIN — KETOROLAC TROMETHAMINE 30 MG: 30 INJECTION, SOLUTION INTRAMUSCULAR at 14:15

## 2021-12-11 RX ADMIN — ACETAMINOPHEN 975 MG: 325 TABLET, FILM COATED ORAL at 14:14

## 2021-12-11 RX ADMIN — Medication 0.15 MG: at 00:49

## 2021-12-11 RX ADMIN — KETOROLAC TROMETHAMINE 30 MG: 30 INJECTION, SOLUTION INTRAMUSCULAR at 08:14

## 2021-12-11 RX ADMIN — FENTANYL CITRATE 15 MCG: 50 INJECTION, SOLUTION INTRAMUSCULAR; INTRAVENOUS at 00:49

## 2021-12-11 RX ADMIN — FENTANYL CITRATE 50 MCG: 50 INJECTION, SOLUTION INTRAMUSCULAR; INTRAVENOUS at 01:40

## 2021-12-11 RX ADMIN — SODIUM CITRATE AND CITRIC ACID MONOHYDRATE 30 ML: 500; 334 SOLUTION ORAL at 00:19

## 2021-12-11 RX ADMIN — BUPIVACAINE HYDROCHLORIDE IN DEXTROSE 13.5 MG: 7.5 INJECTION, SOLUTION SUBARACHNOID at 00:49

## 2021-12-11 RX ADMIN — SODIUM CHLORIDE, POTASSIUM CHLORIDE, SODIUM LACTATE AND CALCIUM CHLORIDE 500 ML: 600; 310; 30; 20 INJECTION, SOLUTION INTRAVENOUS at 14:46

## 2021-12-11 RX ADMIN — AZITHROMYCIN MONOHYDRATE 500 MG: 500 INJECTION, POWDER, LYOPHILIZED, FOR SOLUTION INTRAVENOUS at 00:19

## 2021-12-11 RX ADMIN — METOCLOPRAMIDE HYDROCHLORIDE 10 MG: 5 INJECTION INTRAMUSCULAR; INTRAVENOUS at 03:34

## 2021-12-11 RX ADMIN — KETOROLAC TROMETHAMINE 30 MG: 30 INJECTION, SOLUTION INTRAMUSCULAR at 02:15

## 2021-12-11 RX ADMIN — SODIUM CHLORIDE, POTASSIUM CHLORIDE, SODIUM LACTATE AND CALCIUM CHLORIDE: 600; 310; 30; 20 INJECTION, SOLUTION INTRAVENOUS at 01:15

## 2021-12-11 RX ADMIN — FENTANYL CITRATE 50 MCG: 50 INJECTION, SOLUTION INTRAMUSCULAR; INTRAVENOUS at 01:19

## 2021-12-11 RX ADMIN — SENNOSIDES AND DOCUSATE SODIUM 1 TABLET: 50; 8.6 TABLET ORAL at 10:52

## 2021-12-11 RX ADMIN — EPHEDRINE SULFATE 25 MG: 50 INJECTION INTRAVENOUS at 05:05

## 2021-12-11 RX ADMIN — CEFAZOLIN SODIUM 2 G: 10 INJECTION, POWDER, FOR SOLUTION INTRAVENOUS at 00:19

## 2021-12-11 RX ADMIN — PROMETHAZINE HYDROCHLORIDE 25 MG: 25 INJECTION INTRAMUSCULAR; INTRAVENOUS at 05:05

## 2021-12-11 RX ADMIN — ACETAMINOPHEN 975 MG: 325 TABLET, FILM COATED ORAL at 00:26

## 2021-12-11 RX ADMIN — SENNOSIDES AND DOCUSATE SODIUM 1 TABLET: 50; 8.6 TABLET ORAL at 20:20

## 2021-12-11 RX ADMIN — Medication 15 MG: at 01:40

## 2021-12-11 RX ADMIN — ACETAMINOPHEN 975 MG: 325 TABLET, FILM COATED ORAL at 20:20

## 2021-12-11 RX ADMIN — HYDROXYZINE HYDROCHLORIDE 50 MG: 25 INJECTION, SOLUTION INTRAMUSCULAR at 04:58

## 2021-12-11 RX ADMIN — Medication 25 MG: at 01:17

## 2021-12-11 RX ADMIN — PHENYLEPHRINE HYDROCHLORIDE 0.4 MCG/KG/MIN: 10 INJECTION INTRAVENOUS at 00:50

## 2021-12-11 RX ADMIN — SODIUM CHLORIDE, SODIUM LACTATE, POTASSIUM CHLORIDE, CALCIUM CHLORIDE AND DEXTROSE MONOHYDRATE: 5; 600; 310; 30; 20 INJECTION, SOLUTION INTRAVENOUS at 13:48

## 2021-12-11 RX ADMIN — OXYTOCIN-SODIUM CHLORIDE 0.9% IV SOLN 30 UNIT/500ML 10 ML: 30-0.9/5 SOLUTION at 01:21

## 2021-12-11 RX ADMIN — OXYTOCIN-SODIUM CHLORIDE 0.9% IV SOLN 30 UNIT/500ML 190 ML: 30-0.9/5 SOLUTION at 01:54

## 2021-12-11 ASSESSMENT — ACTIVITIES OF DAILY LIVING (ADL)
ADLS_ACUITY_SCORE: 5
ADLS_ACUITY_SCORE: 7
ADLS_ACUITY_SCORE: 5
ADLS_ACUITY_SCORE: 7
ADLS_ACUITY_SCORE: 5
ADLS_ACUITY_SCORE: 7
ADLS_ACUITY_SCORE: 5
ADLS_ACUITY_SCORE: 7
ADLS_ACUITY_SCORE: 5
ADLS_ACUITY_SCORE: 5
ADLS_ACUITY_SCORE: 7
ADLS_ACUITY_SCORE: 5
ADLS_ACUITY_SCORE: 5
ADLS_ACUITY_SCORE: 7
ADLS_ACUITY_SCORE: 5
ADLS_ACUITY_SCORE: 7

## 2021-12-11 NOTE — ANESTHESIA PROCEDURE NOTES
Intrathecal Procedure Note    Pre-Procedure   Staff -        Anesthesiologist:  Primo Antonio MD       Performed By: Anesthesiologist       Location: OR       Pre-Anesthestic Checklist: patient identified, IV checked, risks and benefits discussed, informed consent, monitors and equipment checked, pre-op evaluation and at physician/surgeon's request  Timeout:       Correct Patient: Yes        Correct Procedure: Yes        Correct Site: Yes        Correct Position: Yes   Procedure Documentation  Procedure: intrathecal       Diagnosis: intrauterine pregnancy       Patient Position: sitting       Patient Prep/Sterile Barriers: sterile gloves, mask       Skin prep: Betadine       Insertion Site: L3-4. (midline approach).       Needle Gauge: 24.        Needle Length (Inches): 3.5        Spinal Needle Type: Poonam tip       Introducer used      # of attempts: 1 and  # of redirects:     Assessment/Narrative         Paresthesias: No.       CSF fluid: clear.      Opening pressure was cmH2O while  Sitting.

## 2021-12-11 NOTE — PLAN OF CARE
Pt s/p primary C/S; DOS.  VS and assessment WNL.  Pt pain well controlled.  Tolerating activity.  Voiding without issue and passing gas.  Tolerating regular diet.  Attempting breastfeeding with infant, occ utilizing nipple shield; infant sleepy at breast - pt initiated breast pumping and supplementing infant with formula via bottle per parents feeding plan of choice.  Positive bonding observed with infant.  Spouse at bedside and supportive of couplet.  Pt resting well in room; stable.  Continue with current plan of care.

## 2021-12-11 NOTE — OP NOTE
Procedure Date: 2021    PREOPERATIVE DIAGNOSES:    1.  Intrauterine pregnancy, 38-1/7 weeks' gestation.    2.  Chronic hypertension.  3.  Maternal obesity with a BMI of 45.    4.  Fetal intolerance of labor, remote from delivery, unresponsive to remedial measures.    POSTOPERATIVE DIAGNOSES:    1.  Intrauterine pregnancy, 38-1/7 weeks' gestation.    2.  Chronic hypertension.  3.  Maternal obesity with a BMI of 45.    4.  Fetal intolerance of labor, remote from delivery, unresponsive to remedial measures.  5.  Nuchal cord x 3.    TITLE OF PROCEDURE:  Low segment transverse  section with manual placental removal.    SURGEON:  Ludwin Santoro MD    HISTORY OF PRESENT ILLNESS:  The patient is a 36-year-old white female,  3, para 2-0-0-2, with an EDC of 2021, blood group O, Rh negative, rubella status immune, who was brought in for induction of labor for chronic hypertension.  The patient has been on aspirin 81 mg daily and not on other antihypertensive medications.  Normal baseline labs had been obtained earlier in the gestation.  Maternal obesity was noted.      During the induction process, the patient progressed to 4 cm and during the first stage has developed recurrent episodes of fetal bradycardia, unresponsive to remedial measures.  Pitocin had been stopped and a decision made to proceed with a low segment transverse  section.  I think the patient and her  have a good understanding of the nature of the problem, the nature of the procedure, the risks, the benefits, the alternatives.  All their questions have been answered.  Informed consent has been obtained.    OPERATIVE FINDINGS:  The infant was a 5-pound 15 ounce female infant, Apgars of 9 and 9 at 1 and 5 minutes respectively, delivered from a left occiput anterior presentation.  There was a nuchal cord x 3, which was tight and was reduced prior to delivery of the remainder of the infant.    DESCRIPTION OF PROCEDURE:   After obtaining informed consent, the patient was taken to the Operating Room where her spinal block was placed.  The patient was placed in the dorsal lithotomy position and prepped and draped in the usual sterile fashion.  A hard stop was performed with adequate analgesia present, a Pfannenstiel skin incision was made and carried down to the level of fascia, cauterizing bleeders as necessary.  The fascia was nicked in the midline.  The fascial incision extended laterally in the right and left direction with the Bhatt scissors.  The fascia was then meticulously dissected off the underlying rectus muscles inferiorly to the level of symphysis, superiorly to the level of the umbilicus.  Rectus diastasis was identified and divided, the peritoneum identified and grasped with 2 Sarbjit, tented, entered with the Metzenbaum scissors and the incision extended superiorly and inferiorly.  The uterus was checked for rotation.  A bladder blade was placed inferiorly.  The vesicouterine reflection identified, grasped with pickups, incised with Metzenbaum scissors, and the incision extended laterally in the right and left direction.  Following this, a low segment transverse uterine incision was made.  The endometrial cavity was entered with a blunt end of the scalpel handle and extended laterally with finger fracture.  There was an anterior placenta.      The fetal vertex was elevated from the pelvis and delivered atraumatically with fundal pressure.  Oral and nasopharynx were suctioned with bulb suction.  The remainder of the infant was delivered atraumatically without difficulty after reducing the nuchal cord.  Delayed cord clamping was undertaken as the infant was vigorous.  The cord was then doubly clamped, cut, and handed off to the resuscitation team who were present for the delivery.  The placenta was delivered by manual extraction.      The uterus was exteriorized and closed in the following manner:  The right and left  lateral margins were closed with a figure-of-X suture of #1 Monocryl.  The first layer was then closed with a running locked suture of #1 Monocryl.  A second vertical imbricating suture of #1 Monocryl was placed superior to the first.  With good hemostasis, the bladder flap was reapproximated with a running suture of 3-0 Monocryl.  Fallopian tubes and ovaries were inspected and found to be normal bilaterally.  The pelvis was suctioned of excess clot and heme and the uterus was replaced in the abdominal cavity.      At this point, we paused.  We documented the sponge, needle, and instrument counts had all been checked and were documented to be correct x 2 and that hemostasis was checked and found to be acceptable.  With counts correct and hemostasis acceptable, the peritoneum was closed with a running suture of 2-0 Vicryl.  Subfascial area was inspected and found to be hemostatic and the fascia reapproximated with a running suture of 0 looped PDS.  At the completion of the repair, there was good reapproximation with no palpable defects.  The subcutaneous fat layer was irrigated with warm saline, reapproximated with a running suture of 3-0 plain.  Skin was closed with Insorb stapler.  Quantitative blood loss for this procedure was 743 mL     The patient tolerated the procedure well and was returned to recovery area in good condition.    Ludwin Santoro MD        D: 2021   T: 2021   MT: [id number]    Name:     DARIN SERNA  MRN:      [id number]        Account:        [id number]   :      1985           Procedure Date: 2021     Document: [id number]    cc:  Gale Beck MD

## 2021-12-11 NOTE — ANESTHESIA CARE TRANSFER NOTE
Patient: Layne Blas    Procedure: Procedure(s):   SECTION       Diagnosis: * No pre-op diagnosis entered *  Diagnosis Additional Information: No value filed.    Anesthesia Type:   Spinal     Note:    Oropharynx: spontaneously breathing  Level of Consciousness: awake  Oxygen Supplementation: room air    Independent Airway: airway patency satisfactory and stable  Dentition: dentition unchanged  Vital Signs Stable: post-procedure vital signs reviewed and stable  Report to RN Given: handoff report given  Patient transferred to: Labor and Delivery  Comments: To L/D, report to RN.        Vitals:  Vitals Value Taken Time   BP     Temp     Pulse     Resp     SpO2         Electronically Signed By: DAXA Noguera CRNA  2021  2:15 AM

## 2021-12-11 NOTE — PLAN OF CARE
Data: Layne Blas transferred to 422 via cart at 0645. Baby transferred via parent's arms.  Action: Receiving unit notified of transfer: Yes. Patient and family notified of room change. Report given to Deedee ESPINOZA at 0735. Belongings sent to receiving unit. Accompanied by Registered Nurse. Oriented patient to surroundings. Call light within reach. ID bands double-checked with receiving RN.  Response: Patient tolerated transfer and is stable. Patients mobililty level scored using the bedside mobility assistance tool (BMAT). Patient is at a mobility level test number: 3. Mobility equipment used: hovermat. Required assist of 1 staff members. Further use of BMAT scoring required.

## 2021-12-11 NOTE — PROGRESS NOTES
Pt seen and evaluated.  Fetus had another episode of fetal bradycardia with contractions.  Pt was on 3 miu pitocin.  Risks, benefits, and alternative modes of therapy discussed at length. Pathophysiology of the disease process reviewed, all of the patients questions answered and a decision made to proceed with a LST C/B,  informed consent obtained.  Indication, fetal intolerance of labor remote from delivery

## 2021-12-11 NOTE — PLAN OF CARE
MD called updated regarding patients nausea and vomiting, and panic attack.. Ordered vistaril IM and phenergan and ephedrine IM.

## 2021-12-11 NOTE — PLAN OF CARE
VS WNL.  Fundus firm, scant lochia.  Ambulating to bathroom.  Low urine output (see provider notification note).  Unable to void this morning, bladder scanned for 76 ml.  This afternoon pt was able to void 150 ml, bladder scanned after void for 36 ml. IV fluids infusing per orders.  Tolerating regular diet, encouraged to increase po fluids.  Pain well managed with Tylenol and Toradol, pt declines pain this shift.  Incision covered with dressing, scant dried drainage, marked with no extension.  No signs of infection noted.  Breastfeeding and hand expressing with staff assist.  Spouse at bedside and supportive.  Positive bonding observed with infant.  Continue to monitor.

## 2021-12-11 NOTE — PROGRESS NOTES
[facility] Postop C/S Note    S:  Patient without complaints other than typical soreness.  Minimal lochia.  Flatus passed, tolerating Clear liquids diet well    O:  Blood pressure 108/75, pulse 65, temperature 98  F (36.7  C), temperature source Oral, resp. rate 16, last menstrual period 02/20/2021, SpO2 99 %, unknown if currently breastfeeding.            Intake/Output Summary (Last 24 hours) at 12/11/2021 0758  Last data filed at 12/11/2021 0700  Gross per 24 hour   Intake 1800 ml   Output 1043 ml   Net 757 ml           Abdomen - Non-distended, Normoactive bowel sounds, soft, appropriately tender; Fundus firm, at umbilicus, nontender        Dressing/Incision - clean, dry, intact        Extremities - No calf tenderness    Hemoglobin   Date Value Ref Range Status   12/11/2021 12.2 11.7 - 15.7 g/dL Final       A:   Postop Day# 0, s/p Primary LTCS - doing well        CHTN - BP normal        Good urine output    P:  1)  Routine care        2)  Ambulate later today        3)  Advance diet        4)  Hgb in AM            Scott Tovar MD

## 2021-12-11 NOTE — ANESTHESIA PREPROCEDURE EVALUATION
Anesthesia Pre-Procedure Evaluation    Patient: Layne Blas   MRN: [id number] : 1985        Preoperative Diagnosis: * No pre-op diagnosis entered *    Procedure : Procedure(s):   SECTION          Past Medical History:   Diagnosis Date     Anxiety      Depression      Depressive disorder      Hx of previous reproductive problem      Migraines      Obesity      PCOS (polycystic ovarian syndrome)      Postpartum depression      Rh incompatibility      Thyroid disease     parathyroid removed      Past Surgical History:   Procedure Laterality Date     C REMOVAL OF KIDNEY STONE       PARATHYROIDECTOMY  2010      Allergies   Allergen Reactions     Adhesive Tape Rash      Social History     Tobacco Use     Smoking status: Never Smoker     Smokeless tobacco: Never Used   Substance Use Topics     Alcohol use: Not Currently     Comment: occ      Wt Readings from Last 1 Encounters:   21 116.6 kg (257 lb)        Anesthesia Evaluation   Pt has had prior anesthetic.         ROS/MED HX  ENT/Pulmonary:  - neg pulmonary ROS  (-) sleep apnea   Neurologic:       Cardiovascular:     (+) hypertension-----    METS/Exercise Tolerance:     Hematologic:       Musculoskeletal:       GI/Hepatic:       Renal/Genitourinary:       Endo:     (+) Obesity,     Psychiatric/Substance Use:     (+) psychiatric history anxiety     Infectious Disease:       Malignancy:       Other:            Physical Exam    Airway        Mallampati: III   TM distance: > 3 FB   Neck ROM: full     Respiratory Devices and Support         Dental           Cardiovascular          Rhythm and rate: regular and normal     Pulmonary           breath sounds clear to auscultation           OUTSIDE LABS:  CBC:   Lab Results   Component Value Date    WBC 11.2 (H) 2021    WBC 12.3 (H) 2021    HGB 12.2 2021    HGB 12.2 2021    HCT 36.5 2021    HCT 44.8 2021     2021     2021     BMP:    Lab Results   Component Value Date     05/06/2021     05/05/2021    POTASSIUM 3.0 (L) 05/06/2021    POTASSIUM 3.3 (L) 05/05/2021    CHLORIDE 106 05/06/2021    CHLORIDE 106 05/05/2021    CO2 20 05/06/2021    CO2 20 05/05/2021    BUN 7 05/06/2021    BUN 6 (L) 05/05/2021    CR 0.62 05/06/2021    CR 0.58 05/05/2021     (H) 05/06/2021     (H) 05/05/2021     COAGS: No results found for: PTT, INR, FIBR  POC:   Lab Results   Component Value Date    HCG Positive (A) 04/13/2021    HCGS Negative 07/31/2015     HEPATIC:   Lab Results   Component Value Date    ALBUMIN 4.1 05/06/2021    PROTTOTAL 8.1 05/06/2021    ALT 25 05/06/2021    AST 11 05/06/2021    ALKPHOS 61 05/06/2021    BILITOTAL 0.9 05/06/2021     OTHER:   Lab Results   Component Value Date    WIL 9.1 05/06/2021    MAG 2.0 05/06/2021    LIPASE 100 05/06/2021    TSH 2.38 07/31/2015       Anesthesia Plan    ASA Status:  3, emergent    NPO Status:  NPO Appropriate    Anesthesia Type: Spinal.              Consents    Anesthesia Plan(s) and associated risks, benefits, and realistic alternatives discussed. Questions answered and patient/representative(s) expressed understanding.    - Discussed:     - Discussed with:  Patient         Postoperative Care    Pain management: IV analgesics, Oral pain medications, intrathecal morphine, Neuraxial analgesia, Multi-modal analgesia.   PONV prophylaxis: Ondansetron (or other 5HT-3), Dexamethasone or Solumedrol     Comments:                Primo Antonio MD

## 2021-12-11 NOTE — PROVIDER NOTIFICATION
12/10/21 2231   Provider Notification   Provider Name/Title Dr. Santoro   Method of Notification At Bedside   MD bedside discussing plan of care.  Plan is to place IUPC,   Pt agrees with plan

## 2021-12-11 NOTE — PROGRESS NOTES
Preop Diagnosis: IUP 38.1 weeks gestation, chronic hypertension, maternal obesity,BMI 45, fetal intolerance of labor remote from from delivery unresponsive to remedial measures    Post-Op Diagnosis: same, nuchal cord x's3    Procedure: LST  section and Manual placental removal    Surgeon: WHITLEY REDD MD,     EBL: 743 cc    Anesthesia: Spinal    Path: placenta    Complications: no problems reported    Findings: see dictated operative note for full details        WHITLEY REDD MD  2:11 AM  2021

## 2021-12-11 NOTE — PROVIDER NOTIFICATION
12/10/21 1955   Provider Notification   Provider Name/Title Dr. Sanotro   Method of Notification Electronic Page   Request Evaluate - Remote   Notification Reason Decels   MD called due to recurrent Variable decels.  Positioned changed and 250 ml IV bolus given.  MD ordered for pitocin off for one hour and restart @  1mu/min, and up by 1 mu/min.pitocin stopped.

## 2021-12-11 NOTE — PROVIDER NOTIFICATION
12/11/21 1420   Provider Notification   Provider Name/Title Dr. Tovar   Method of Notification Phone   Request Evaluate-Remote   Notification Reason Other   Notified of low urine output.  Pt started tolerating clear fluids around 0730 this AM, had been vomiting overnight.  Catheter removed previous shift.  Since catheter removed pt has voided 150 ml in 7 hours.  Telephone order received for fluid bolus of 500 ml LR over 30 min.  May repeat fluid bolus x1 after 30 minutes if urine output does not increase.  Notify MD after 2nd bolus if urine output inadequate.

## 2021-12-11 NOTE — PROVIDER NOTIFICATION
12/10/21 2200   Provider Notification   Provider Name/Title Dr. Santoro   Method of Notification Electronic Page   Request Evaluate in Person   Notification Reason Decels;SVE;Status Update

## 2021-12-11 NOTE — H&P
[facility] Labor and Delivery History and Physical    Layne Serna MRN# [id number]   Age: 36 year old YOB: 1985     Date of Admission:  12/10/2021    Primary care provider: Gale Beck           Chief Complaint:   Layne Serna is a 36 year old white female  Estimated Date of Delivery: Dec 24, 2021  who is 38w0d pregnant and being admitted for induction of labor, indication chronic HTN, an ASA 81 mg daily not on meds with normal baseline labs earlier in gestation, maternal obesity.  Risks, benefits, and alternative modes of therapy discussed at length. Pathophysiology of the disease process reviewed, all of the patients questions answered and informed consent obtained.            Pregnancy history:     OBSTETRIC HISTORY:    OB History    Para Term  AB Living   3 2 2 0 0 2   SAB IAB Ectopic Multiple Live Births   0 0 0 0 2      # Outcome Date GA Lbr Raymond/2nd Weight Sex Delivery Anes PTL Lv   3 Current            2 Term 05/31/15 41w1d 01:46 / 00:01 3.561 kg (7 lb 13.6 oz) M Vag-Spont None N DEMARIO      Apgar1: 8  Apgar5: 9   1 Term 13 40w5d 04:10 / 02:24 3.725 kg (8 lb 3.4 oz) M Vag-Spont EPI  DEMARIO      Birth Comments: none      Name: ALFREDO SERNA      Apgar1: 8  Apgar5: 9       EDC: Estimated Date of Delivery: 21    Prenatal Labs:   Lab Results   Component Value Date    ABO O 06/10/2021    RH Neg 06/10/2021    AS Negative 12/10/2021    HEPBANG Nonreactive 06/10/2021    CHPCRT Negative 06/10/2021    GCPCRT Negative 06/10/2021    TREPAB negative 10/06/2014    RUBELLAABIGG immune 10/06/2014    HGB 12.2 2021    HIV Non reactive 2012       GBS Status:   Lab Results   Component Value Date    GBS negative 2015       Active Problem List  Patient Active Problem List   Diagnosis     Indication for care in labor or delivery     Vaginal delivery      (spontaneous vaginal delivery)     Major depression, recurrent (H)      Hyperemesis gravidarum       Medication Prior to Admission  Medications Prior to Admission   Medication Sig Dispense Refill Last Dose     citalopram (CELEXA) 10 MG tablet Take 1 tablet (10 mg) by mouth daily 30 tablet 4 12/9/2021 at Unknown time     metoclopramide (REGLAN) 5 MG tablet TAKE 1 TO 2 TABLETS(5 TO 10 MG) BY MOUTH FOUR TIMES DAILY 180 tablet 1 Past Week at Unknown time     ondansetron (ZOFRAN-ODT) 4 MG ODT tab DISSOLVE 1 TABLET(4 MG) ON THE TONGUE EVERY 6 HOURS AS NEEDED FOR NAUSEA 30 tablet 1 Past Week at Unknown time     PRENATAL VIT-FE BISG-FA-DHA PO    Past Month at Unknown time     VITAMIN D, CHOLECALCIFEROL, PO Take by mouth daily    12/9/2021 at Unknown time   .        Maternal Past Medical History:     Past Medical History:   Diagnosis Date     Anxiety      Depression      Depressive disorder      Hx of previous reproductive problem      Migraines      Obesity      PCOS (polycystic ovarian syndrome)      Postpartum depression      Rh incompatibility      Thyroid disease     parathyroid removed                       Family History:   I have reviewed this patient's family history            Social History:   I have reviewed this patient's social history         Review of Systems:   CONSTITUTIONAL: NEGATIVE for fever, chills, change in weight  ENT/MOUTH: NEGATIVE for ear, mouth and throat problems  RESP: NEGATIVE for significant cough or SOB  CV: NEGATIVE for chest pain, palpitations or peripheral edema          Physical Exam:   Vitals were reviewed  All vitals stable  Temp: 97.4  F (36.3  C) Temp src: Oral BP: 133/78 Pulse: 83   Resp: 18        Constitutional:   awake, alert, cooperative, no apparent distress, and appears stated age     Eyes:   Lids and lashes normal, pupils equal, round and reactive to light, extra ocular muscles intact, sclera clear, conjunctiva normal     ENT:   Normocephalic, without obvious abnormality, atraumatic, sinuses nontender on palpation, external ears without lesions,  oral pharynx with moist mucous membranes, tonsils without erythema or exudates, gums normal and good dentition.     Neck:   Supple, symmetrical, trachea midline, no adenopathy, thyroid symmetric, not enlarged and no tenderness, skin normal     Back:   Symmetric, no curvature, spinous processes are non-tender on palpation, paraspinous muscles are non-tender on palpation, no costal vertebral tenderness     Lungs:   No increased work of breathing, good air exchange, clear to auscultation bilaterally, no crackles or wheezing     Cardiovascular:   Normal apical impulse, regular rate and rhythm, normal S1 and S2, no S3 or S4, and no murmur noted     Abdomen:   No scars, normal bowel sounds, soft, gravid uterus EFW 7.5# domingo infant vtx, non-tender, no masses palpated, no hepatosplenomegally     Genitounirinary:   External Genitalia:  General appearance; normal     Musculoskeletal:   There is no redness, warmth, or swelling of the joints.  Full range of motion noted.  Motor strength is 5 out of 5 all extremities bilaterally.  Tone is normal.     Neurologic:   Awake, alert, oriented to name, place and time.  Cranial nerves II-XII are grossly intact.  Motor is 5 out of 5 bilaterally.  Cerebellar finger to nose, heel to shin intact.  Sensory is intact.  Babinski down going, Romberg negative, and gait is normal.      Cervix:   Membranes: AROM  clear amniotic fluid   Dilation: 2   Effacement: 50%   Station:-2   Consistency: average   Position: Mid  Presentation:Cephalic  Fetal Heart Rate Tracing: Tier 1 (normal)  Tocometer: external monitor                       Assessment:   Layne Blas is a 38w0d pregnant female admitted with induction of labor,  indication chronic HTN, an ASA 81 mg daily not on meds with normal baseline labs earlier in gestation, maternal obesity.          Plan:   Admit - see IP orders  Labor induction with Pitocin and AROM  clinical pelvimetry adequate for this EFW to my exam.  anticipate vag  pernell Santoro MD

## 2021-12-11 NOTE — ANESTHESIA POSTPROCEDURE EVALUATION
Patient: Layne Blas    Procedure: Procedure(s):   SECTION       Diagnosis:* No pre-op diagnosis entered *  Diagnosis Additional Information: No value filed.    Anesthesia Type:  Spinal    Note:  Disposition: Inpatient   Postop Pain Control: Uneventful            Sign Out: Well controlled pain   PONV: No   Neuro/Psych: Uneventful            Sign Out: Acceptable/Baseline neuro status   Airway/Respiratory: Uneventful            Sign Out: Acceptable/Baseline resp. status   CV/Hemodynamics: Uneventful            Sign Out: Acceptable CV status; No obvious hypovolemia; No obvious fluid overload   Other NRE: NONE   DID A NON-ROUTINE EVENT OCCUR? No           Last vitals:  Vitals Value Taken Time   /65 21 0228   Temp     Pulse     Resp     SpO2 100 % 21 0230   Vitals shown include unvalidated device data.    Electronically Signed By: Primo Antonio MD  2021  2:34 AM

## 2021-12-11 NOTE — PROGRESS NOTES
Pt seen and examined.  FHT's now a Cat 1 tracing.  Pitocin is off.  Earlier tracings reviewed.  Cx 4 cm  50% effaced  vtx -2 station, IUPC placed  /78   Pulse 83   Temp 97.4  F (36.3  C) (Oral)   Resp 18   LMP 02/20/2021       A:  IUP 38 weeks  IOL for chronic HTN, maternal obesity  Baseline labs normal not on meds for HTN  P:  Will restart pitocin.  If FHT's remain Cat 1 will proceed.  If periodic changes reoccur will consider C/B.  Plan rev with pt and her SO  All questions answered and consent obtained

## 2021-12-12 LAB
CREAT SERPL-MCNC: 0.57 MG/DL (ref 0.52–1.04)
GFR SERPL CREATININE-BSD FRML MDRD: >90 ML/MIN/1.73M2
HGB BLD-MCNC: 9.6 G/DL (ref 11.7–15.7)

## 2021-12-12 PROCEDURE — 250N000013 HC RX MED GY IP 250 OP 250 PS 637: Performed by: OBSTETRICS & GYNECOLOGY

## 2021-12-12 PROCEDURE — 250N000011 HC RX IP 250 OP 636: Performed by: OBSTETRICS & GYNECOLOGY

## 2021-12-12 PROCEDURE — 120N000001 HC R&B MED SURG/OB

## 2021-12-12 PROCEDURE — 36415 COLL VENOUS BLD VENIPUNCTURE: CPT | Performed by: OBSTETRICS & GYNECOLOGY

## 2021-12-12 PROCEDURE — 999N000080 HC STATISTIC IP LACTATION SERVICES 16-30 MIN

## 2021-12-12 PROCEDURE — 85018 HEMOGLOBIN: CPT | Performed by: OBSTETRICS & GYNECOLOGY

## 2021-12-12 PROCEDURE — 82565 ASSAY OF CREATININE: CPT | Performed by: OBSTETRICS & GYNECOLOGY

## 2021-12-12 RX ADMIN — IBUPROFEN 800 MG: 800 TABLET, FILM COATED ORAL at 02:07

## 2021-12-12 RX ADMIN — ACETAMINOPHEN 975 MG: 325 TABLET, FILM COATED ORAL at 02:07

## 2021-12-12 RX ADMIN — ACETAMINOPHEN 975 MG: 325 TABLET, FILM COATED ORAL at 20:34

## 2021-12-12 RX ADMIN — IBUPROFEN 800 MG: 800 TABLET, FILM COATED ORAL at 14:35

## 2021-12-12 RX ADMIN — HYDROCORTISONE: 25 CREAM TOPICAL at 19:42

## 2021-12-12 RX ADMIN — ACETAMINOPHEN 975 MG: 325 TABLET, FILM COATED ORAL at 14:36

## 2021-12-12 RX ADMIN — SENNOSIDES AND DOCUSATE SODIUM 1 TABLET: 50; 8.6 TABLET ORAL at 08:02

## 2021-12-12 RX ADMIN — IBUPROFEN 800 MG: 800 TABLET, FILM COATED ORAL at 08:02

## 2021-12-12 RX ADMIN — SIMETHICONE 80 MG: 80 TABLET, CHEWABLE ORAL at 11:45

## 2021-12-12 RX ADMIN — IBUPROFEN 800 MG: 800 TABLET, FILM COATED ORAL at 20:34

## 2021-12-12 RX ADMIN — ENOXAPARIN SODIUM 40 MG: 40 INJECTION SUBCUTANEOUS at 01:41

## 2021-12-12 RX ADMIN — ENOXAPARIN SODIUM 40 MG: 40 INJECTION SUBCUTANEOUS at 13:59

## 2021-12-12 RX ADMIN — ACETAMINOPHEN 975 MG: 325 TABLET, FILM COATED ORAL at 08:02

## 2021-12-12 RX ADMIN — SENNOSIDES AND DOCUSATE SODIUM 1 TABLET: 50; 8.6 TABLET ORAL at 20:34

## 2021-12-12 ASSESSMENT — ACTIVITIES OF DAILY LIVING (ADL)
ADLS_ACUITY_SCORE: 5

## 2021-12-12 NOTE — PROGRESS NOTES
Postpartum progress note  2021     S: Got a lot of sleep overnight. Pain is pretty well controlled. Lochia minimal. Ambulating without difficulty. Voiding without difficulty. Passing flatus, no BM yet.  Tolerating po intake.     PHYSICAL EXAM:   Vitals:    21 1643 21 2034 21 2300 21 0728   BP: 105/60 109/67 121/71 134/82   Pulse: 86 83  88   Resp: 18 18 17 16   Temp: 98.1  F (36.7  C) 97.5  F (36.4  C) 98.7  F (37.1  C) 98.4  F (36.9  C)   TempSrc: Oral Oral Oral Oral   SpO2: 100% 97%         General: sitting up, alert and cooperative  Abd: soft, non-distended, non-tender. Fundus firm, nontender, 2 cm below umbilicus.   Incision clean/dry/intact with dressing in place.  +reythmatous rash surrounding.    Extremities: calves nontender, 1+ edema of lower extremities bilaterally    There were no vitals filed for this visit.    Lab Results   Component Value Date    HGB 9.6 2021    HGB 12.2 2021    HGB 15.6 2021    HGB 14.9 2021     Blood type:   Lab Results   Component Value Date    RH Neg 06/10/2021         ASSESSMENT: 36 year old  POD 1 s/p PLTCS.     PLAN:  - cHTN, on no meds, BP normal  - BMI 45: on BID lovenox while in hospital  - Heme: 12.2> 9.6, thus far asymptomatic, ABLA  - Feeding: breast  - Birth control: not discussed   - Rh status: neg, s/p Rhogam yesterday  - Rubella status: immune  - Dispo: home POD 2-3 when meeting post op goals    Ofelia Romero MD, MPH  [facility]

## 2021-12-12 NOTE — PLAN OF CARE
VSS. Pain well managed with ibuprofen and Tylenol. Incision to lower abdomen WDL. Ambulating and voiding without difficulty. Breastfeeding, pumping and supplementing with formula. Bonding well with infant and independent with cares. SO at bedside and supportive.

## 2021-12-12 NOTE — LACTATION NOTE
Lactation in to see patient. Baby has been sleepy. Parents have been attempting and supplementing with formula via bottle. Layne has a history of pumping and bottle feeding her first with good milk supply. Shield at bedside applied due to sleepiness and formula placed under shield with TB syringe. Baby will suck just when milk placed under shield. Suck is non nutritive. Encouraged same plan. With parents attempt to nurse, pump and bottle feed. Reviewed cleaning and care of pump parts. No questions at this time.

## 2021-12-12 NOTE — PLAN OF CARE
VS WNL.  Fundus firm, scant lochia.  Up independently, ambulating in room; encouraged ambulating in hallway.  Voiding without difficulty.  Incision dressing intact, scant dried drainage.  Rash started forming above incision bandage, pt states likely from tape.  Tolerating regular diet.  Pain well managed with Tylenol and IBU.  Attempting breastfeeding every 2-3 hours, however infant not active at breast.  Pt is pumping and supplementing with formula.  Lactation visit today (see lactation note).  Significant other at bedside and supportive.  Positive bonding with infant observed.  Continue plan of care.

## 2021-12-13 VITALS
SYSTOLIC BLOOD PRESSURE: 143 MMHG | OXYGEN SATURATION: 97 % | TEMPERATURE: 97.7 F | DIASTOLIC BLOOD PRESSURE: 93 MMHG | RESPIRATION RATE: 16 BRPM | HEART RATE: 84 BPM

## 2021-12-13 PROCEDURE — 250N000013 HC RX MED GY IP 250 OP 250 PS 637: Performed by: OBSTETRICS & GYNECOLOGY

## 2021-12-13 PROCEDURE — 250N000011 HC RX IP 250 OP 636: Performed by: OBSTETRICS & GYNECOLOGY

## 2021-12-13 RX ORDER — OXYCODONE HYDROCHLORIDE 5 MG/1
5 TABLET ORAL EVERY 4 HOURS PRN
Qty: 12 TABLET | Refills: 0 | Status: SHIPPED | OUTPATIENT
Start: 2021-12-13 | End: 2021-12-23

## 2021-12-13 RX ORDER — CLOBETASOL PROPIONATE 0.5 MG/G
CREAM TOPICAL 2 TIMES DAILY
Status: DISCONTINUED | OUTPATIENT
Start: 2021-12-13 | End: 2021-12-13 | Stop reason: HOSPADM

## 2021-12-13 RX ORDER — ACETAMINOPHEN 325 MG/1
650 TABLET ORAL EVERY 4 HOURS PRN
COMMUNITY
Start: 2021-12-13 | End: 2022-04-14

## 2021-12-13 RX ORDER — AMOXICILLIN 250 MG
2 CAPSULE ORAL 2 TIMES DAILY
Qty: 12 TABLET | Refills: 0 | Status: SHIPPED | OUTPATIENT
Start: 2021-12-13 | End: 2021-12-23

## 2021-12-13 RX ORDER — IBUPROFEN 200 MG
400 TABLET ORAL EVERY 4 HOURS PRN
COMMUNITY
Start: 2021-12-13 | End: 2022-04-14

## 2021-12-13 RX ADMIN — ENOXAPARIN SODIUM 40 MG: 40 INJECTION SUBCUTANEOUS at 01:36

## 2021-12-13 RX ADMIN — CLOBETASOL PROPIONATE: 0.5 CREAM TOPICAL at 08:55

## 2021-12-13 RX ADMIN — ACETAMINOPHEN 975 MG: 325 TABLET, FILM COATED ORAL at 02:32

## 2021-12-13 RX ADMIN — ENOXAPARIN SODIUM 40 MG: 40 INJECTION SUBCUTANEOUS at 13:24

## 2021-12-13 RX ADMIN — IBUPROFEN 800 MG: 800 TABLET, FILM COATED ORAL at 08:13

## 2021-12-13 RX ADMIN — ACETAMINOPHEN 975 MG: 325 TABLET, FILM COATED ORAL at 08:13

## 2021-12-13 RX ADMIN — IBUPROFEN 800 MG: 800 TABLET, FILM COATED ORAL at 14:30

## 2021-12-13 RX ADMIN — SENNOSIDES AND DOCUSATE SODIUM 1 TABLET: 50; 8.6 TABLET ORAL at 08:14

## 2021-12-13 RX ADMIN — IBUPROFEN 800 MG: 800 TABLET, FILM COATED ORAL at 02:32

## 2021-12-13 RX ADMIN — ACETAMINOPHEN 975 MG: 325 TABLET, FILM COATED ORAL at 14:30

## 2021-12-13 ASSESSMENT — ACTIVITIES OF DAILY LIVING (ADL)
ADLS_ACUITY_SCORE: 5

## 2021-12-13 NOTE — PLAN OF CARE
"Pt was having mildly elevated BP's this shift, Dr Santoro was updated on this matter, pt has declined any PIH s/s, has bp cuff at home and knows to call if 160/110 or higher; MD is \"OK\" discharging pt to home early, discharge education completed and follow up discussed in 2 and 6 weeks, got cream for rash, sending to home with pt; will be discharging to home shortly in stable condition.  "

## 2021-12-13 NOTE — DISCHARGE INSTRUCTIONS
Postop  Birth Instructions    Activity       Do not lift more than 10 pounds for 6 weeks after surgery.  Ask family and friends for help when you need it.    No driving until you have stopped taking your pain medications (usually two weeks after surgery).    No heavy exercise or activity for 6 weeks.  Don't do anything that will put a strain on your surgery site.    Don't strain when using the toilet.  Your care team may prescribe a stool softener if you have problems with your bowel movements.     To care for your incision:       Keep the incision clean and dry.    Do not soak your incision in water. No swimming or hot tubs until it has fully healed. You may soak in the bathtub if the water level is below your incision.    Do not use peroxide, gel, cream, lotion, or ointment on your incision.    Adjust your clothes to avoid pressure on your surgery site (check the elastic in your underwear for example).     You may see a small amount of clear or pink drainage and this is normal.  Check with your health care provider:       If the drainage increases or has an odor.    If the incision reddens, you have swelling, or develop a rash.    If you have increased pain and the medicine we prescribed doesn't help.    If you have a fever above 100.4 F (38 C) with or without chills when placing thermometer under your tongue.   The area around your incision (surgery wound), will feel numb.  This is normal. The numbness should go away in less than a year.     Keep your hands clean:  Always wash your hands before touching your incision (surgery wound). This helps reduce your risk of infection. If your hands aren't dirty, you may use an alcohol hand-rub to clean your hands. Keep your nails clean and short.    Call your healthcare provider if you have any of these symptoms:       You soak a sanitary pad with blood within 1 hour, or you see blood clots larger than a golf ball.    Bleeding that lasts more than 6  weeks.    Vaginal discharge that smells bad.    Severe pain, cramping or tenderness in your lower belly area.    A need to urinate more frequently (use the toilet more often), more urgently (use the toilet very quickly), or it burns when you urinate.    Nausea and vomiting.    Redness, swelling or pain around a vein in your leg.    Problems breastfeeding or a red or painful area on your breast.    Chest pain and cough or are gasping for air.    Problems with coping with sadness, anxiety or depression. If you have concerns about hurting yourself or the baby, call your provider immediately.      You have questions or concerns after you return home.              Preeclampsia   Call your doctor right away if you have any of the following:  - Edema (swelling) in your face or hands  - Rapid weight gain-about 1 pound or more in a day  - Headache  - Abdominal pain on your right side  - Vision problems (flashes or spots)  - You have questions or concerns once you return home.

## 2021-12-13 NOTE — PLAN OF CARE
Pt s/p primary C/S; POD1.  VS and assessment WNL.  Voiding without difficulty and passed BM.  Pain well controlled.  Pt tolerating activity in room; encouraged to increase ambulation.  Showered.  Dressing removed for sutures underneath - pt has rash around wear incisional dressing was in place - pt has tape allergy, given hydrocortisone cream for irritation at site - pt states relief.  Pt is breastfeeding infant with nipple shield, pumping and supplementing infant with any EBM and formula.  Positive bonding observed with infant.  Pt spouse at bedside and supportive of couplet.  Pt resting well in room and meeting expected goals. Stable; continue with current plan of care.

## 2021-12-13 NOTE — PROVIDER NOTIFICATION
12/13/21 2487   Provider Notification   Provider Name/Title Dr. Santoro   Method of Notification Phone   Request Evaluate - Remote   Notification Reason Maternal Vital Sign Change;Status Update     MD updated on patient's most recent BP check of 143/93.  RN confirming that MD is okay with discharging her to home.  Orders to continue with discharge with education on when to return.  Will update RN in charge of discharge education.

## 2021-12-13 NOTE — DISCHARGE SUMMARY
[facility] Obstetrics Post-Op / Discharge Summary Note         Assessment and Plan:    Assessment:   Post-operative day #2  Low transverse primary  section  L&D complications: 1.  Intrauterine pregnancy, 38-1/7 weeks' gestation.    2.  Chronic hypertension.  3.  Maternal obesity with a BMI of 45.    4.  Fetal intolerance of labor, remote from delivery, unresponsive to remedial measures.  5.  Nuchal cord x 3.  6). Failed induction of labor      Doing well.  Clean wound without signs of infection.  Normal healing wound.  No immediate surgical complications identified.  No excessive bleeding  Pain well-controlled.  Pt notes erythremic rash in area of the IOban drape that is irritating no signs of infection  Hydrocortisone not helping a lot  Pt and rcaiely desire discharge today  ABLA related to normal post op course      Plan:   Ambulation encouraged  Breast feeding strategies discussed  Monitor wound for signs of infection  Pain control measures as needed  Reportable signs and symptoms dicussed with the patient  rash cares discussed  Will have pt use benadryl orally and clobetasol cream bid as well as keeping area cool anc dry  No lifting > 10 lbs for 6 wks  Take your tempature twice daily for 3 days and call if > 100.4  Nothing in vagina for 6 wks  Continue taking prenatal MVI daily for 1 month    Discharge later today           Interval History:   Doing well.  Pain is well-controlled.  No fevers.  No history of wound drainage, warmth or significant erythema.  Good appetite.  Denies chest pain, shortness of breath, nausea or vomiting.  Ambulatory.  Breastfeeding well.          Significant Problems:      Past Medical History:   Diagnosis Date     Anxiety      Depression      Depressive disorder      Hx of previous reproductive problem      Migraines      Obesity      PCOS (polycystic ovarian syndrome)      Postpartum depression      Rh incompatibility      Thyroid disease     parathyroid removed              Review of Systems:    The patient denies any chest pain, shortness of breath, excessive pain, fever, chills, purulent drainage from the wound, nausea or vomiting.          Medications:     All medications related to the patient's surgery have been reviewed  Current Facility-Administered Medications   Medication     acetaminophen (TYLENOL) tablet 975 mg     bisacodyl (DULCOLAX) Suppository 10 mg     carboprost (HEMABATE) injection 250 mcg     dextrose 5% in lactated ringers infusion     enoxaparin ANTICOAGULANT (LOVENOX) injection 40 mg     hydrocortisone 2.5 % cream     ibuprofen (ADVIL/MOTRIN) tablet 800 mg     IF subcutaneous (SQ) Unfractionated heparin (UFH) ordered for thromboprophylaxis    Or     IF intravenous (IV) Unfractionated heparin (UFH) ordered    Or     IF LOW-dose Low molecular weight heparin (LMWH) thromboprophylaxis ordered    Or     IF HIGHER-dose Low molecular weight heparin (LMWH) thromboprophylaxis ordered     lanolin cream     lidocaine (LMX4) cream     lidocaine 1 % 0.1-1 mL     lidocaine 1 % 0.1-20 mL     methylergonovine (METHERGINE) injection 200 mcg     metoclopramide (REGLAN) injection 10 mg    Or     metoclopramide (REGLAN) tablet 10 mg     misoprostol (CYTOTEC) tablet 400 mcg    Or     misoprostol (CYTOTEC) tablet 800 mcg     nalbuphine (NUBAIN) injection 2.5-5 mg     naloxone (NARCAN) injection 0.2 mg    Or     naloxone (NARCAN) injection 0.4 mg    Or     naloxone (NARCAN) injection 0.2 mg    Or     naloxone (NARCAN) injection 0.4 mg     No MMR Needed -  Assessment: Patient does not need MMR vaccine     No Tdap Needed - Assessment: Patient does not need Tdap vaccine     ondansetron (ZOFRAN-ODT) ODT tab 4 mg    Or     ondansetron (ZOFRAN) injection 4 mg     Opioid plan postpartum - medication instruction     oxyCODONE (ROXICODONE) tablet 5 mg     oxytocin (PITOCIN) 30 units in 500 mL 0.9% NaCl infusion     oxytocin (PITOCIN) 30 units in 500 mL 0.9% NaCl infusion     oxytocin  (PITOCIN) injection 10 Units     oxytocin (PITOCIN) injection 10 Units     prochlorperazine (COMPAZINE) injection 10 mg    Or     prochlorperazine (COMPAZINE) tablet 10 mg    Or     prochlorperazine (COMPAZINE) suppository 25 mg     senna-docusate (SENOKOT-S/PERICOLACE) 8.6-50 MG per tablet 1 tablet    Or     senna-docusate (SENOKOT-S/PERICOLACE) 8.6-50 MG per tablet 2 tablet     simethicone (MYLICON) chewable tablet 80 mg     sodium phosphate (FLEET ENEMA) 1 enema     tranexamic acid 1 g in 100 mL NS IV bag (premix)             Physical Exam:   Vitals were reviewed  All vitals stable  Temp: 98.5  F (36.9  C) Temp src: Oral BP: 131/82 Pulse: 81   Resp: 16        Wound clean and dry with minimal or no drainage.  Surrounding skin with minimal erythema.          Data:     All laboratory data related to this surgery reviewed  Hemoglobin   Date Value Ref Range Status   12/12/2021 9.6 (L) 11.7 - 15.7 g/dL Final   12/11/2021 12.2 11.7 - 15.7 g/dL Final   09/30/2021 12.2 11.7 - 15.7 g/dL Final   05/06/2021 15.6 11.7 - 15.7 g/dL Final   05/05/2021 14.9 11.7 - 15.7 g/dL Final   04/13/2021 14.6 11.7 - 15.7 g/dL Final   06/05/2018 15.8 (H) 11.7 - 15.7 g/dL Final   07/31/2015 12.5 11.7 - 15.7 g/dL Final     No imaging studies have been ordered    Ludwin Santoro MD

## 2021-12-13 NOTE — PROGRESS NOTES
[facility] Obstetrics Post-Op / Progress Note         Assessment and Plan:    Assessment:   Post-operative day #2  Low transverse primary  section  L&D complications: 1.  Intrauterine pregnancy, 38-1/7 weeks' gestation.    2.  Chronic hypertension.  3.  Maternal obesity with a BMI of 45.    4.  Fetal intolerance of labor, remote from delivery, unresponsive to remedial measures.  5.  Nuchal cord x 3.  6). Failed induction of labor      Doing well.  Clean wound without signs of infection.  Normal healing wound.  No immediate surgical complications identified.  No excessive bleeding  Pain well-controlled.  Pt notes erythremic rash in area of the IOban drape that is irritating no signs of infection  Hydrocortisone not helping a lot  Pt and raciely desire discharge today  ABLA related to normal post op course      Plan:   Ambulation encouraged  Breast feeding strategies discussed  Monitor wound for signs of infection  Pain control measures as needed  Reportable signs and symptoms dicussed with the patient  rash cares discussed  Will have pt use benadryl orally and clobetasol cream bid as well as keeping area cool anc dry  No lifting > 10 lbs for 6 wks  Take your tempature twice daily for 3 days and call if > 100.4  Nothing in vagina for 6 wks  Continue taking prenatal MVI daily for 1 month    Discharge later today           Interval History:   Doing well.  Pain is well-controlled.  No fevers.  No history of wound drainage, warmth or significant erythema.  Good appetite.  Denies chest pain, shortness of breath, nausea or vomiting.  Ambulatory.  Breastfeeding well.          Significant Problems:      Past Medical History:   Diagnosis Date     Anxiety      Depression      Depressive disorder      Hx of previous reproductive problem      Migraines      Obesity      PCOS (polycystic ovarian syndrome)      Postpartum depression      Rh incompatibility      Thyroid disease     parathyroid removed              Review of Systems:    The patient denies any chest pain, shortness of breath, excessive pain, fever, chills, purulent drainage from the wound, nausea or vomiting.          Medications:     All medications related to the patient's surgery have been reviewed  Current Facility-Administered Medications   Medication     acetaminophen (TYLENOL) tablet 975 mg     bisacodyl (DULCOLAX) Suppository 10 mg     carboprost (HEMABATE) injection 250 mcg     dextrose 5% in lactated ringers infusion     enoxaparin ANTICOAGULANT (LOVENOX) injection 40 mg     hydrocortisone 2.5 % cream     ibuprofen (ADVIL/MOTRIN) tablet 800 mg     IF subcutaneous (SQ) Unfractionated heparin (UFH) ordered for thromboprophylaxis    Or     IF intravenous (IV) Unfractionated heparin (UFH) ordered    Or     IF LOW-dose Low molecular weight heparin (LMWH) thromboprophylaxis ordered    Or     IF HIGHER-dose Low molecular weight heparin (LMWH) thromboprophylaxis ordered     lanolin cream     lidocaine (LMX4) cream     lidocaine 1 % 0.1-1 mL     lidocaine 1 % 0.1-20 mL     methylergonovine (METHERGINE) injection 200 mcg     metoclopramide (REGLAN) injection 10 mg    Or     metoclopramide (REGLAN) tablet 10 mg     misoprostol (CYTOTEC) tablet 400 mcg    Or     misoprostol (CYTOTEC) tablet 800 mcg     nalbuphine (NUBAIN) injection 2.5-5 mg     naloxone (NARCAN) injection 0.2 mg    Or     naloxone (NARCAN) injection 0.4 mg    Or     naloxone (NARCAN) injection 0.2 mg    Or     naloxone (NARCAN) injection 0.4 mg     No MMR Needed -  Assessment: Patient does not need MMR vaccine     No Tdap Needed - Assessment: Patient does not need Tdap vaccine     ondansetron (ZOFRAN-ODT) ODT tab 4 mg    Or     ondansetron (ZOFRAN) injection 4 mg     Opioid plan postpartum - medication instruction     oxyCODONE (ROXICODONE) tablet 5 mg     oxytocin (PITOCIN) 30 units in 500 mL 0.9% NaCl infusion     oxytocin (PITOCIN) 30 units in 500 mL 0.9% NaCl infusion     oxytocin (PITOCIN)  injection 10 Units     oxytocin (PITOCIN) injection 10 Units     prochlorperazine (COMPAZINE) injection 10 mg    Or     prochlorperazine (COMPAZINE) tablet 10 mg    Or     prochlorperazine (COMPAZINE) suppository 25 mg     senna-docusate (SENOKOT-S/PERICOLACE) 8.6-50 MG per tablet 1 tablet    Or     senna-docusate (SENOKOT-S/PERICOLACE) 8.6-50 MG per tablet 2 tablet     simethicone (MYLICON) chewable tablet 80 mg     sodium phosphate (FLEET ENEMA) 1 enema     tranexamic acid 1 g in 100 mL NS IV bag (premix)             Physical Exam:   Vitals were reviewed  All vitals stable  Temp: 98.5  F (36.9  C) Temp src: Oral BP: 131/82 Pulse: 81   Resp: 16        Wound clean and dry with minimal or no drainage.  Surrounding skin with minimal erythema.          Data:     All laboratory data related to this surgery reviewed  Hemoglobin   Date Value Ref Range Status   12/12/2021 9.6 (L) 11.7 - 15.7 g/dL Final   12/11/2021 12.2 11.7 - 15.7 g/dL Final   09/30/2021 12.2 11.7 - 15.7 g/dL Final   05/06/2021 15.6 11.7 - 15.7 g/dL Final   05/05/2021 14.9 11.7 - 15.7 g/dL Final   04/13/2021 14.6 11.7 - 15.7 g/dL Final   06/05/2018 15.8 (H) 11.7 - 15.7 g/dL Final   07/31/2015 12.5 11.7 - 15.7 g/dL Final     No imaging studies have been ordered    Ludwin Santoro MD

## 2021-12-13 NOTE — PLAN OF CARE
Data: Vital signs within normal limits. Postpartum checks within normal limits - see flow record. Patient eating and drinking normally. Patient able to empty bladder independently and is up ambulating. No apparent signs of infection. Incision healing well. Rash noted to area around incision where tape was.  Pt is using hydrocortisone to area. Patient performing self cares and is able to care for infant.  Action: Patient medicated during the shift for pain. See MAR. Patient reassessed within 1 hour after each medication and pain was improved - patient stated she was comfortable.   Response: Positive attachment behaviors observed with infant. Support persons is present.   Plan: Anticipate discharge on 12/13.

## 2021-12-14 ENCOUNTER — PATIENT OUTREACH (OUTPATIENT)
Dept: CARE COORDINATION | Facility: CLINIC | Age: 36
End: 2021-12-14
Payer: COMMERCIAL

## 2021-12-14 DIAGNOSIS — Z71.89 OTHER SPECIFIED COUNSELING: ICD-10-CM

## 2021-12-14 NOTE — PROGRESS NOTES
Clinic Care Coordination Contact  [facility]/Voicemail       Clinical Data: Care Coordinator Outreach  Outreach attempted x 1.  Left message on patient's voicemail with call back information and requested return call.  Plan: Care Coordinator will try to reach patient again in 1-2 business days.    Naya Caballero  Community Health Worker  [facility]  [phone]

## 2021-12-15 NOTE — PROGRESS NOTES
"Clinic Care Coordination Contact  [facility]: Post-Discharge Note  SITUATION                                                      Admission:    Admission Date: 12/10/21   Reason for Admission: induction of labor  Discharge:   Discharge Date: 21  Discharge Diagnosis: induction of labor    BACKGROUND                                                      Layne Blas is a 36 year old white female  Estimated Date of Delivery: Dec 24, 2021  who is 38w0d pregnant and being admitted for induction of labor, indication chronic HTN, an ASA 81 mg daily not on meds with normal baseline labs earlier in gestation, maternal obesity.  Risks, benefits, and alternative modes of therapy discussed at length. Pathophysiology of the disease process reviewed, all of the patients questions answered and informed consent obtained.    ASSESSMENT      Enrollment  Primary Care Care Coordination Status: Declined    Discharge Assessment  How are you doing now that you are home?: \"We're doing really well\"  How are your symptoms? (Red Flag symptoms escalate to triage hotline per guidelines): Improved  Do you feel your condition is stable enough to be safe at home until your provider visit?: Yes  Does the patient have their discharge instructions? : Yes  Does the patient have questions regarding their discharge instructions? : No  Were you started on any new medications or were there changes to any of your previous medications? : No  Does the patient have all of their medications?: Yes  Do you have questions regarding any of your medications? : No  Do you have all of your needed medical supplies or equipment (DME)?  (i.e. oxygen tank, CPAP, cane, etc.): Yes  Discharge follow-up appointment scheduled within 14 calendar days? : No  Is patient agreeable to assistance with scheduling? : No                  PLAN                                                      Outpatient Plan:  Follow up with primary care provider, Gale SUAZO" Kiran, within 2 weeks to recheck the rash and 6 weeks for a PO visit, to  evaluate after surgery and for hospital follow- up. No follow up labs or test are needed.    Future Appointments   Date Time Provider Department Center   12/23/2021  1:30 PM Gale Beck MD [facility]   1/18/2022 10:30 AM Gale Beck MD [facility]         For any urgent concerns, please contact our 24 hour nurse triage line: [phone] ([address] [place] [address])         Naya Caballero  Community Health Worker  [facility]  [phone]

## 2021-12-23 ENCOUNTER — OFFICE VISIT (OUTPATIENT)
Dept: OBGYN | Facility: CLINIC | Age: 36
End: 2021-12-23
Payer: COMMERCIAL

## 2021-12-23 VITALS — DIASTOLIC BLOOD PRESSURE: 86 MMHG | WEIGHT: 252.3 LBS | SYSTOLIC BLOOD PRESSURE: 128 MMHG | BODY MASS INDEX: 46.15 KG/M2

## 2021-12-23 DIAGNOSIS — Z98.891 STATUS POST CESAREAN SECTION: ICD-10-CM

## 2021-12-23 DIAGNOSIS — B37.2 YEAST INFECTION OF THE SKIN: Primary | ICD-10-CM

## 2021-12-23 PROCEDURE — 99213 OFFICE O/P EST LOW 20 MIN: CPT | Performed by: OBSTETRICS & GYNECOLOGY

## 2021-12-23 RX ORDER — NYSTATIN 100000 [USP'U]/G
POWDER TOPICAL 3 TIMES DAILY
Qty: 45 G | Refills: 3 | Status: SHIPPED | OUTPATIENT
Start: 2021-12-23 | End: 2022-01-02

## 2021-12-23 NOTE — NURSING NOTE
"Chief Complaint   Patient presents with     Post-op Visit      on 21, patient states she is doing good, no signs of infection.        Initial /86   Wt 114.4 kg (252 lb 4.8 oz)   LMP 2021   BMI 46.15 kg/m   Estimated body mass index is 46.15 kg/m  as calculated from the following:    Height as of 6/10/21: 1.575 m (5' 2\").    Weight as of this encounter: 114.4 kg (252 lb 4.8 oz).  BP completed using cuff size: large    Questioned patient about current smoking habits.  Pt. has never smoked.          The following HM Due: NONE      Radha Amaro CMA               "

## 2021-12-23 NOTE — PROGRESS NOTES
Here for postop--also notes rash on incision. Previously due to adhesive tape, which has now cleared. This is slightly itchy. Notes an odor as well. Keeping this area as clean and dry as possible. Good pain control. No heavy bleeding.    /86   Wt 114.4 kg (252 lb 4.8 oz)   LMP 2021   BMI 46.15 kg/m      Abdomen: nontender. Incision is clean, dry, and intact, healing very well. Topical yeast infection noted.    IMP: status post   Yeast infection, skin    Plan:  -Start nystatin powder tid to the area.  -follow up in 4 weeks for postpartum exam.    Gale Beck MD

## 2022-01-18 ENCOUNTER — PRENATAL OFFICE VISIT (OUTPATIENT)
Dept: OBGYN | Facility: CLINIC | Age: 37
End: 2022-01-18
Payer: COMMERCIAL

## 2022-01-18 VITALS
HEIGHT: 62 IN | BODY MASS INDEX: 45.64 KG/M2 | DIASTOLIC BLOOD PRESSURE: 80 MMHG | SYSTOLIC BLOOD PRESSURE: 122 MMHG | WEIGHT: 248 LBS

## 2022-01-18 PROCEDURE — 99207 PR POST PARTUM EXAM: CPT | Performed by: OBSTETRICS & GYNECOLOGY

## 2022-01-18 ASSESSMENT — PATIENT HEALTH QUESTIONNAIRE - PHQ9: SUM OF ALL RESPONSES TO PHQ QUESTIONS 1-9: 1

## 2022-01-18 ASSESSMENT — MIFFLIN-ST. JEOR: SCORE: 1778.17

## 2022-01-18 NOTE — NURSING NOTE
"Chief Complaint   Patient presents with     Postpartum Care       Initial /80   Ht 1.575 m (5' 2\")   Wt 112.5 kg (248 lb)   LMP 2021   Breastfeeding No   BMI 45.36 kg/m   Estimated body mass index is 45.36 kg/m  as calculated from the following:    Height as of this encounter: 1.575 m (5' 2\").    Weight as of this encounter: 112.5 kg (248 lb).  BP completed using cuff size: regular    Questioned patient about current smoking habits.  Pt. has never smoked.          The following HM Due: NONE      The following patient reported/Care Every where data was sent to:  P ABSTRACT QUALITY INITIATIVES [[id number]]  Nataliya Wen LPN               "

## 2022-01-18 NOTE — PROGRESS NOTES
"Layne is here for a 6-week postpartum checkup.    She had a c/s for maternal complications of a viable girl, weight 5 pounds 15 oz., with no complications. Date of delivery was 21. Since delivery, she has not been breast feeding.  She has no signs of infection, bleeding or other complications.  She is not pregnant.  We discussed contraception and she has chosen Mirena IUD.      Post partum tubal: No  History of Gestational Diabetes? No  Type of Delivery:    Feeding Method:  Breast and formula  If initiated breast feeding and stopped, how long did you breast feed?:  Not applicable    REVIEW OF SYSTEMS:    Contraception Plan: Mirena IUD  Feels that depression symptoms are under excellent control with Celexa, but anxiety could be a bit better. Discussed increasing the dose to 20 mg daily. She would like to try this. Overall, feels that things are going really well.    EXAM:  /80   Ht 1.575 m (5' 2\")   Wt 112.5 kg (248 lb)   LMP 2021   Breastfeeding No   BMI 45.36 kg/m    HEENT: grossly normal.  ABDOMEN: soft, non tender, good bowel sounds, without masses rebound, guarding or tenderness.  Incision: clean, intact. Still with yeast topically which she states happens intermittently. Using nystatin powder as needed. Discussed using interdry dressing as well.  EXTREMITIES:  warm to touch, good pulses, no ankle edema or calf tenderness.  NEUROLOGIC: grossly normal.    ASSESSMENT:   6-week postpartum exam after c/s for abnormal FHT.    PLAN:    Contraception methods discussed  Exercise encouraged  Follow up for IUD appointment as she wishes.  One-hour glucose tolerance test needed? No  IUD for contraception.    Gale Beck MD      "

## 2022-01-25 ENCOUNTER — OFFICE VISIT (OUTPATIENT)
Dept: OBGYN | Facility: CLINIC | Age: 37
End: 2022-01-25
Payer: COMMERCIAL

## 2022-01-25 VITALS — WEIGHT: 250 LBS | SYSTOLIC BLOOD PRESSURE: 132 MMHG | BODY MASS INDEX: 45.73 KG/M2 | DIASTOLIC BLOOD PRESSURE: 78 MMHG

## 2022-01-25 DIAGNOSIS — Z30.430 ENCOUNTER FOR INSERTION OF INTRAUTERINE CONTRACEPTIVE DEVICE: Primary | ICD-10-CM

## 2022-01-25 LAB — HCG IFA URINE: NEGATIVE

## 2022-01-25 PROCEDURE — 84703 CHORIONIC GONADOTROPIN ASSAY: CPT | Performed by: OBSTETRICS & GYNECOLOGY

## 2022-01-25 PROCEDURE — 58300 INSERT INTRAUTERINE DEVICE: CPT | Performed by: OBSTETRICS & GYNECOLOGY

## 2022-01-25 NOTE — PROGRESS NOTES
IUD Insertion:  CONSULT:  Layne Blas is a 36 year old female  on 2021 underwent a low segment transverse  section for arrest of labor and fetal intolerance of labor presents today with requests for Mirena IUD.  The patient has had intercourse since delivery and used a condom without leakage or spillage.  Her last intercourse was last evening.  Her pregnancy test today is negative.  Is a pregnancy test required: Yes.  Was it positive or negative?  Negative  Was a consent obtained?  Yes    Subjective: Layne Blas is a 36 year old  presents for IUD and desires Mirena type IUD.    Patient has been given the opportunity to ask questions about all forms of birth control, including all options appropriate for Layne Blas. Discussed that no method of birth control, except abstinence is 100% effective against pregnancy or sexually transmitted infection.     Layne Blas understands she may have the IUD removed at any time. IUD should be removed by a health care provider.    The entire insertion procedure was reviewed with the patient, including care after placement.    No LMP recorded. Last sexual activity: Last evening. No allergy to betadine or shellfish. Patient declines STD screening  HCG Qual Urine   Date Value Ref Range Status   2021 Positive (A) NEG^Negative Final     Comment:     This test is for screening purposes.  Results should be interpreted along with   the clinical picture.  Confirmation testing is available if warranted by   ordering [id number], HCG Quantitative Pregnancy.           There were no vitals taken for this visit.    Pelvic Exam:   EG/BUS: normal genital architecture without lesions, erythema or abnormal secretions.   Vagina: moist, pink, rugae with physiologic discharge and secretions  Cervix: Multiparous cervix no lesions and pink, moist, closed, without lesion or CMT  Uterus: Mid position, mobile, no pain  Adnexa: within normal limits and no  masses, nodularity, tenderness    PROCEDURE NOTE: -- IUD Insertion    Reason for Insertion: contraception    Premedicated with ibuprofen.  Under sterile technique, cervix was visualized with speculum and prepped with Betadine solution swab x 3. Tenaculum was placed for stability. The uterus was gently straightened and sounded to 8.0 cm. IUD prepared for placement, and IUD inserted according to 's instructions without difficulty or significant resitance, and deployed at the fundus. The strings were visualized and trimmed to 2.5 cm from the external os. Tenaculum was removed and hemostasis noted. Speculum removed.  Patient tolerated procedure well.    Lot #   Exp:     EBL: minimal    Complications: none    ASSESSMENT: (Z30.430) Encounter for insertion of intrauterine contraceptive device  (primary encounter diagnosis)  Comment: Patient is doing well.  Will have an ultrasound in 1 month to confirm proper position.  Pre and post insertion instructions reviewed post insertion instruction sheet given.  We discussed the low probability of expulsion or migration of the IUD.  She understands and accepts.  We discussed checking the string post menses  Plan: HCG IFA Urine POCT, Enter/Edit Result,         levonorgestrel (MIRENA) 20 MCG/24HR IUD,         levonorgestrel (MIRENA) 20 MCG/24HR IUD 20 mcg,        INSERTION INTRAUTERINE DEVICE, US Pelvic         Complete with Transvaginal        Done          PLAN:    Given 's handouts, including when to have IUD removed, list of danger s/sx, side effects and follow up recommended. Encouraged condom use for prevention of STD. Back up contraception advised for 7 days if progestin method. Advised to call for any fever, for prolonged or severe pain or bleeding, abnormal vaginal discharge, or unable to palpate strings. She was advised to use pain medications (ibuprofen) as needed for mild to moderate pain. Advised to follow-up in clinic in 4-6 weeks for IUD string  check if unable to find strings or as directed by provider.     Ludwin Santoro MD

## 2022-01-25 NOTE — PATIENT INSTRUCTIONS
You can reach your [facility] Care Team any time of the day by calling [phone]. This number will put you in touch with the 24 hour nurse line if the clinic is closed.    To contact your OB/GYN Station Coordinator/Surgery Scheduler please call [phone]. This is a direct number for your care team between 8 a.m. and 4 p.m. Monday through Friday.    [facility] is open for your convenience:  Monday through Friday 8 a.m. to 6 p.m.  Closed weekends and all major holidays.

## 2022-03-29 ENCOUNTER — TELEPHONE (OUTPATIENT)
Dept: OBGYN | Facility: CLINIC | Age: 37
End: 2022-03-29
Payer: COMMERCIAL

## 2022-03-31 ENCOUNTER — ANCILLARY PROCEDURE (OUTPATIENT)
Dept: ULTRASOUND IMAGING | Facility: CLINIC | Age: 37
End: 2022-03-31
Attending: OBSTETRICS & GYNECOLOGY
Payer: COMMERCIAL

## 2022-03-31 DIAGNOSIS — Z30.430 ENCOUNTER FOR INSERTION OF INTRAUTERINE CONTRACEPTIVE DEVICE: ICD-10-CM

## 2022-03-31 PROCEDURE — 76830 TRANSVAGINAL US NON-OB: CPT | Performed by: OBSTETRICS & GYNECOLOGY

## 2022-03-31 PROCEDURE — 76856 US EXAM PELVIC COMPLETE: CPT | Performed by: OBSTETRICS & GYNECOLOGY

## 2022-04-14 ENCOUNTER — OFFICE VISIT (OUTPATIENT)
Dept: OBGYN | Facility: CLINIC | Age: 37
End: 2022-04-14
Payer: COMMERCIAL

## 2022-04-14 VITALS
BODY MASS INDEX: 46.01 KG/M2 | SYSTOLIC BLOOD PRESSURE: 126 MMHG | DIASTOLIC BLOOD PRESSURE: 88 MMHG | WEIGHT: 250 LBS | HEIGHT: 62 IN

## 2022-04-14 DIAGNOSIS — N64.4 BREAST PAIN, LEFT: Primary | ICD-10-CM

## 2022-04-14 PROCEDURE — 99213 OFFICE O/P EST LOW 20 MIN: CPT | Performed by: OBSTETRICS & GYNECOLOGY

## 2022-04-14 NOTE — NURSING NOTE
"Chief Complaint   Patient presents with     Breast Problem     Painful lump in left breast       Initial /88 (BP Location: Left arm, Patient Position: Chair, Cuff Size: Adult Large)   Ht 1.575 m (5' 2\")   Wt 113.4 kg (250 lb)   LMP 03/15/2022 (Approximate)   Breastfeeding No   BMI 45.73 kg/m   Estimated body mass index is 45.73 kg/m  as calculated from the following:    Height as of this encounter: 1.575 m (5' 2\").    Weight as of this encounter: 113.4 kg (250 lb).  BP completed using cuff size: large    Questioned patient about current smoking habits.  Pt. has never smoked.          The following HM Due: NONE    Marcy Sawyer CMA    "

## 2022-04-14 NOTE — PROGRESS NOTES
SUBJECTIVE:                                                     Layne Blas is a 36 year old female  who presents to clinic today for breast lump on the left side.    First noticed in December, seemed to come and go a bit.  She is now finished with breast-feeding and pumping, and occasionally still notices this.  It is sometimes sore when she presses on the area.  She has not noticed any skin changes.  She denies any erythema.  No fevers or chills, no body aches.  She is otherwise doing well      Problem list and histories reviewed & adjusted, as indicated.  Additional history: as documented.    Patient Active Problem List   Diagnosis     Indication for care in labor or delivery     Vaginal delivery      (spontaneous vaginal delivery)     Major depression, recurrent (H)     Hyperemesis gravidarum     Past Surgical History:   Procedure Laterality Date      SECTION N/A 2021    Procedure:  SECTION;  Surgeon: Ludwin Santoro MD;  Location: [facility]     PARATHYROIDECTOMY  2010     ZZC REMOVAL OF KIDNEY STONE        Social History     Tobacco Use     Smoking status: Never Smoker     Smokeless tobacco: Never Used   Substance Use Topics     Alcohol use: Not Currently     Comment: occ      Problem (# of Occurrences) Relation (Name,Age of Onset)    Cancer (1) Paternal Grandfather            citalopram (CELEXA) 10 MG tablet, Take 1 tablet (10 mg) by mouth daily  levonorgestrel (MIRENA) 20 MCG/24HR IUD, 1 each (20 mcg) by Intrauterine route once  VITAMIN D, CHOLECALCIFEROL, PO, Take by mouth daily     No current facility-administered medications on file prior to visit.    Allergies   Allergen Reactions     Adhesive Tape Rash       ROS:  Negative other than as noted in HPI.    OBJECTIVE:     Exam:  Constitutional:  Appearance: Well nourished, well developed alert, in no acute distress  Breasts:  Inspection of Breasts:  No lymphadenopathy present; Palpation of Breasts and Axillae:   No masses present on palpation, no breast tenderness Axillary Lymph Nodes:  No lymphadenopathy present      In-Clinic Test Results:  No results found for this or any previous visit (from the past 24 hour(s)).    ASSESSMENT/PLAN:                                                        ICD-10-CM    1. Breast pain, left  N64.4          There is no palpable area of abnormality, though she has typical changes associated with breast-feeding, and that there are some diffuse densities throughout both breasts.  There is no dominant mass palpable at all.  If she notices a change in this area, of asked her to get back in touch with me.  I can see her and we can order follow-up imaging as appropriate.  She was comfortable with this plan and will get back in touch with me if anything changes.    Gale Beck MD  [facility] [place]

## 2022-10-16 ENCOUNTER — HEALTH MAINTENANCE LETTER (OUTPATIENT)
Age: 37
End: 2022-10-16

## 2023-02-06 NOTE — PROGRESS NOTES
Asymptomatic  Cont cardiology   PROBLEM LIST  LABS: Oneg/RI GIRL    1. Chronic hypertension: on baby ASA. Baseline labs normal. Level II US within normal limits. Weekly testing at 32 weeks. Deliver at 38w.  2. BMI 45: level II US. Weekly testing as above. Delivery at 38 weeks for cHTN as above.    Doing well.  Growth scan 9/15. Follow up with me in 1 month. Will do labs and GCT at that time.    Gale Beck MD

## 2023-03-26 ENCOUNTER — HEALTH MAINTENANCE LETTER (OUTPATIENT)
Age: 38
End: 2023-03-26

## 2024-01-15 NOTE — RESULT ENCOUNTER NOTE
Ensure that your hand is not exposed to cold.  Follow-up with primary care physician and in the hand surgery clinic.  Return to the ER for new or worsening blistering, open wounds, numbness or weakness, or any other new or concerning symptoms.  Use aquaphor if you develop any open wounds.     Group B Strep negative, noted.  Gale Beck MD

## 2024-06-01 ENCOUNTER — HEALTH MAINTENANCE LETTER (OUTPATIENT)
Age: 39
End: 2024-06-01

## (undated) DEVICE — SPONGE LAP 18X18" X8435

## (undated) DEVICE — BAG CLEAR TRASH 1.3M 39X33" P4040C

## (undated) DEVICE — PACK C-SECTION LF PL15OTA83B

## (undated) DEVICE — Device

## (undated) DEVICE — ESU GROUND PAD ADULT W/CORD E7507

## (undated) DEVICE — SOL WATER IRRIG 1000ML BOTTLE 2F7114

## (undated) DEVICE — PREP CHLORAPREP 26ML TINTED ORANGE  260815

## (undated) DEVICE — TRANSFER DEVICE BLOOD NDL HOLDER 364880

## (undated) DEVICE — SU VICRYL 2-0 CT-1 36" J345H

## (undated) DEVICE — CATH TRAY FOLEY SURESTEP 16FR DRAIN BAG STATOCK A899916

## (undated) DEVICE — STOCKING SLEEVE VASOPRESS COMPRESSION CALF MED VP501M

## (undated) DEVICE — SU MONOCRYL 1 CT-1 36" Y947H

## (undated) DEVICE — SU PLAIN 3-0 CT 27" 852H

## (undated) DEVICE — LINEN BABY BLANKET 5434

## (undated) DEVICE — SU PDS II 0 CTX 60" Z990G

## (undated) DEVICE — GLOVE PROTEXIS BLUE W/NEU-THERA 6.5  2D73EB65

## (undated) DEVICE — SOL ADH LIQUID BENZOIN SWAB 0.6ML C1544

## (undated) DEVICE — LINEN HALF SHEET 5512

## (undated) DEVICE — GLOVE PROTEXIS POWDER FREE SMT 7.5  2D72PT75X

## (undated) DEVICE — SOL NACL 0.9% IRRIG 1000ML BOTTLE 2F7124

## (undated) DEVICE — SU MONOCRYL 3-0 SH 27" Y316H

## (undated) DEVICE — LINEN FULL SHEET 5511

## (undated) DEVICE — GLOVE PROTEXIS MICRO 7.5  2D73PM75

## (undated) DEVICE — RETR VISCERA FISH MED 3204

## (undated) DEVICE — STPL SKIN SUBCUTICULAR INSORB  2030

## (undated) DEVICE — CAP BABY PINK/BLUE IC-2

## (undated) DEVICE — LINEN TOWEL PACK X10 5473

## (undated) RX ORDER — FENTANYL CITRATE 50 UG/ML
INJECTION, SOLUTION INTRAMUSCULAR; INTRAVENOUS
Status: DISPENSED
Start: 2021-12-11

## (undated) RX ORDER — OXYTOCIN/0.9 % SODIUM CHLORIDE 30/500 ML
PLASTIC BAG, INJECTION (ML) INTRAVENOUS
Status: DISPENSED
Start: 2021-12-11

## (undated) RX ORDER — MORPHINE SULFATE 1 MG/ML
INJECTION, SOLUTION EPIDURAL; INTRATHECAL; INTRAVENOUS
Status: DISPENSED
Start: 2021-12-11